# Patient Record
Sex: FEMALE | Employment: OTHER | URBAN - METROPOLITAN AREA
[De-identification: names, ages, dates, MRNs, and addresses within clinical notes are randomized per-mention and may not be internally consistent; named-entity substitution may affect disease eponyms.]

---

## 2018-08-17 ENCOUNTER — HOSPITAL ENCOUNTER (OUTPATIENT)
Dept: LAB | Age: 78
Discharge: HOME | End: 2018-08-17
Payer: MEDICARE

## 2018-08-17 LAB
ANION GAP SERPL CALC-SCNC: 1 MMOL/L (ref 3–18)
BUN SERPL-MCNC: 26 MG/DL (ref 7–18)
BUN/CREAT SERPL: 4 (ref 12–20)
CALCIUM SERPL-MCNC: 9 MG/DL (ref 8.5–10.1)
CHLORIDE SERPL-SCNC: 99 MMOL/L (ref 100–108)
CO2 SERPL-SCNC: 40 MMOL/L (ref 21–32)
CREAT SERPL-MCNC: 6.03 MG/DL (ref 0.6–1.3)
GLUCOSE SERPL-MCNC: 86 MG/DL (ref 74–99)
POTASSIUM SERPL-SCNC: 3.8 MMOL/L (ref 3.5–5.5)
SODIUM SERPL-SCNC: 140 MMOL/L (ref 136–145)

## 2018-08-17 PROCEDURE — 80048 BASIC METABOLIC PNL TOTAL CA: CPT

## 2018-08-17 PROCEDURE — 36415 COLL VENOUS BLD VENIPUNCTURE: CPT

## 2019-01-22 ENCOUNTER — APPOINTMENT (OUTPATIENT)
Dept: CT IMAGING | Age: 79
DRG: 689 | End: 2019-01-22
Attending: EMERGENCY MEDICINE
Payer: MEDICARE

## 2019-01-22 ENCOUNTER — HOSPITAL ENCOUNTER (INPATIENT)
Age: 79
LOS: 2 days | Discharge: NURSING, REHAB OR LONG-TERM CARE | DRG: 689 | End: 2019-01-24
Attending: EMERGENCY MEDICINE | Admitting: INTERNAL MEDICINE
Payer: MEDICARE

## 2019-01-22 ENCOUNTER — APPOINTMENT (OUTPATIENT)
Dept: GENERAL RADIOLOGY | Age: 79
DRG: 689 | End: 2019-01-22
Attending: EMERGENCY MEDICINE
Payer: MEDICARE

## 2019-01-22 DIAGNOSIS — R47.1 DYSARTHRIA: Primary | ICD-10-CM

## 2019-01-22 DIAGNOSIS — Z99.2 ESRD ON HEMODIALYSIS (HCC): ICD-10-CM

## 2019-01-22 DIAGNOSIS — N18.6 ESRD ON HEMODIALYSIS (HCC): ICD-10-CM

## 2019-01-22 PROBLEM — I73.9 PAD (PERIPHERAL ARTERY DISEASE) (HCC): Status: ACTIVE | Noted: 2019-01-22

## 2019-01-22 PROBLEM — M19.90 OA (OSTEOARTHRITIS): Status: ACTIVE | Noted: 2019-01-22

## 2019-01-22 PROBLEM — I63.9 ISCHEMIC STROKE (HCC): Status: ACTIVE | Noted: 2019-01-22

## 2019-01-22 PROBLEM — I50.32 DIASTOLIC CHF, CHRONIC (HCC): Status: ACTIVE | Noted: 2019-01-22

## 2019-01-22 PROBLEM — N39.0 UTI (URINARY TRACT INFECTION): Status: ACTIVE | Noted: 2019-01-22

## 2019-01-22 LAB
ABO + RH BLD: NORMAL
ALBUMIN SERPL-MCNC: 2.7 G/DL (ref 3.4–5)
ALBUMIN/GLOB SERPL: 0.7 {RATIO} (ref 0.8–1.7)
ALP SERPL-CCNC: 102 U/L (ref 45–117)
ALT SERPL-CCNC: 12 U/L (ref 13–56)
AMPHET UR QL SCN: NEGATIVE
ANION GAP BLD CALC-SCNC: 12 MMOL/L (ref 10–20)
ANION GAP SERPL CALC-SCNC: 7 MMOL/L (ref 3–18)
APPEARANCE UR: ABNORMAL
ASPIRIN TEST, ASPIRN: 653 ARU (ref 620–672)
AST SERPL-CCNC: 25 U/L (ref 15–37)
BACTERIA URNS QL MICRO: ABNORMAL /HPF
BARBITURATES UR QL SCN: NEGATIVE
BASOPHILS # BLD: 0 K/UL (ref 0–0.1)
BASOPHILS NFR BLD: 0 % (ref 0–2)
BENZODIAZ UR QL: NEGATIVE
BILIRUB SERPL-MCNC: 0.5 MG/DL (ref 0.2–1)
BILIRUB UR QL: NEGATIVE
BLOOD GROUP ANTIBODIES SERPL: NORMAL
BUN BLD-MCNC: 51 MG/DL (ref 7–18)
BUN SERPL-MCNC: 44 MG/DL (ref 7–18)
BUN/CREAT SERPL: 5 (ref 12–20)
CA-I BLD-MCNC: 1.25 MMOL/L (ref 1.12–1.32)
CALCIUM SERPL-MCNC: 10.2 MG/DL (ref 8.5–10.1)
CANNABINOIDS UR QL SCN: NEGATIVE
CHLORIDE BLD-SCNC: 97 MMOL/L (ref 100–108)
CHLORIDE SERPL-SCNC: 100 MMOL/L (ref 100–108)
CK MB CFR SERPL CALC: ABNORMAL % (ref 0–4)
CK MB SERPL-MCNC: <1 NG/ML (ref 5–25)
CK SERPL-CCNC: 19 U/L (ref 26–192)
CO2 BLD-SCNC: 37 MMOL/L (ref 19–24)
CO2 SERPL-SCNC: 35 MMOL/L (ref 21–32)
COCAINE UR QL SCN: NEGATIVE
COLOR UR: YELLOW
CREAT SERPL-MCNC: 8.9 MG/DL (ref 0.6–1.3)
CREAT UR-MCNC: 8.2 MG/DL (ref 0.6–1.3)
DIFFERENTIAL METHOD BLD: ABNORMAL
EOSINOPHIL # BLD: 0.1 K/UL (ref 0–0.4)
EOSINOPHIL NFR BLD: 1 % (ref 0–5)
EPITH CASTS URNS QL MICRO: ABNORMAL /LPF (ref 0–5)
ERYTHROCYTE [DISTWIDTH] IN BLOOD BY AUTOMATED COUNT: 14.6 % (ref 11.6–14.5)
FIBRINOGEN PPP-MCNC: 267 MG/DL (ref 210–451)
GLOBULIN SER CALC-MCNC: 3.9 G/DL (ref 2–4)
GLUCOSE BLD STRIP.AUTO-MCNC: 85 MG/DL (ref 70–110)
GLUCOSE BLD STRIP.AUTO-MCNC: 86 MG/DL (ref 74–106)
GLUCOSE SERPL-MCNC: 85 MG/DL (ref 74–99)
GLUCOSE UR STRIP.AUTO-MCNC: NEGATIVE MG/DL
HBV SURFACE AG SER QL: <0.1 INDEX
HBV SURFACE AG SER QL: NEGATIVE
HCT VFR BLD AUTO: 34 % (ref 35–45)
HCT VFR BLD CALC: 40 % (ref 36–49)
HDSCOM,HDSCOM: NORMAL
HGB BLD-MCNC: 11.7 G/DL (ref 12–16)
HGB BLD-MCNC: 13.6 G/DL (ref 12–16)
HGB UR QL STRIP: ABNORMAL
INR PPP: 1 (ref 0.8–1.2)
KETONES UR QL STRIP.AUTO: NEGATIVE MG/DL
LEUKOCYTE ESTERASE UR QL STRIP.AUTO: ABNORMAL
LYMPHOCYTES # BLD: 3.1 K/UL (ref 0.9–3.6)
LYMPHOCYTES NFR BLD: 26 % (ref 21–52)
MCH RBC QN AUTO: 32.1 PG (ref 24–34)
MCHC RBC AUTO-ENTMCNC: 34.4 G/DL (ref 31–37)
MCV RBC AUTO: 93.2 FL (ref 74–97)
METHADONE UR QL: NEGATIVE
MONOCYTES # BLD: 1.4 K/UL (ref 0.05–1.2)
MONOCYTES NFR BLD: 12 % (ref 3–10)
NEUTS SEG # BLD: 7.1 K/UL (ref 1.8–8)
NEUTS SEG NFR BLD: 61 % (ref 40–73)
NITRITE UR QL STRIP.AUTO: POSITIVE
OPIATES UR QL: NEGATIVE
PCP UR QL: NEGATIVE
PH UR STRIP: 8.5 [PH] (ref 5–8)
PLATELET # BLD AUTO: 477 K/UL (ref 135–420)
PMV BLD AUTO: 9.8 FL (ref 9.2–11.8)
POTASSIUM BLD-SCNC: 5 MMOL/L (ref 3.5–5.5)
POTASSIUM SERPL-SCNC: 4.9 MMOL/L (ref 3.5–5.5)
PROT SERPL-MCNC: 6.6 G/DL (ref 6.4–8.2)
PROT UR STRIP-MCNC: 300 MG/DL
PROTHROMBIN TIME: 13 SEC (ref 11.5–15.2)
RBC # BLD AUTO: 3.65 M/UL (ref 4.2–5.3)
RBC #/AREA URNS HPF: ABNORMAL /HPF (ref 0–5)
SODIUM BLD-SCNC: 141 MMOL/L (ref 136–145)
SODIUM SERPL-SCNC: 142 MMOL/L (ref 136–145)
SP GR UR REFRACTOMETRY: 1.01 (ref 1–1.03)
SPECIMEN EXP DATE BLD: NORMAL
T3FREE SERPL-MCNC: 1.6 PG/ML (ref 2.18–3.98)
T4 FREE SERPL-MCNC: 1 NG/DL (ref 0.7–1.5)
THROMBIN TIME: 21.2 SECS (ref 13.8–18.2)
TROPONIN I SERPL-MCNC: 0.03 NG/ML (ref 0–0.04)
TSH SERPL DL<=0.05 MIU/L-ACNC: 1.2 UIU/ML (ref 0.36–3.74)
UROBILINOGEN UR QL STRIP.AUTO: 0.2 EU/DL (ref 0.2–1)
WBC # BLD AUTO: 11.8 K/UL (ref 4.6–13.2)
WBC URNS QL MICRO: ABNORMAL /HPF (ref 0–4)

## 2019-01-22 PROCEDURE — 99285 EMERGENCY DEPT VISIT HI MDM: CPT

## 2019-01-22 PROCEDURE — 82962 GLUCOSE BLOOD TEST: CPT

## 2019-01-22 PROCEDURE — 85670 THROMBIN TIME PLASMA: CPT

## 2019-01-22 PROCEDURE — 85610 PROTHROMBIN TIME: CPT

## 2019-01-22 PROCEDURE — 84481 FREE ASSAY (FT-3): CPT

## 2019-01-22 PROCEDURE — 86706 HEP B SURFACE ANTIBODY: CPT

## 2019-01-22 PROCEDURE — 87040 BLOOD CULTURE FOR BACTERIA: CPT

## 2019-01-22 PROCEDURE — 80307 DRUG TEST PRSMV CHEM ANLYZR: CPT

## 2019-01-22 PROCEDURE — 90935 HEMODIALYSIS ONE EVALUATION: CPT

## 2019-01-22 PROCEDURE — 93005 ELECTROCARDIOGRAM TRACING: CPT

## 2019-01-22 PROCEDURE — 94762 N-INVAS EAR/PLS OXIMTRY CONT: CPT

## 2019-01-22 PROCEDURE — 80047 BASIC METABLC PNL IONIZED CA: CPT

## 2019-01-22 PROCEDURE — 80061 LIPID PANEL: CPT

## 2019-01-22 PROCEDURE — 36415 COLL VENOUS BLD VENIPUNCTURE: CPT

## 2019-01-22 PROCEDURE — 85025 COMPLETE CBC W/AUTO DIFF WBC: CPT

## 2019-01-22 PROCEDURE — 77030021352 HC CBL LD SYS DISP COVD -B

## 2019-01-22 PROCEDURE — 87086 URINE CULTURE/COLONY COUNT: CPT

## 2019-01-22 PROCEDURE — 84439 ASSAY OF FREE THYROXINE: CPT

## 2019-01-22 PROCEDURE — 65660000000 HC RM CCU STEPDOWN

## 2019-01-22 PROCEDURE — 5A1D70Z PERFORMANCE OF URINARY FILTRATION, INTERMITTENT, LESS THAN 6 HOURS PER DAY: ICD-10-PCS | Performed by: INTERNAL MEDICINE

## 2019-01-22 PROCEDURE — 80053 COMPREHEN METABOLIC PANEL: CPT

## 2019-01-22 PROCEDURE — 71045 X-RAY EXAM CHEST 1 VIEW: CPT

## 2019-01-22 PROCEDURE — 82550 ASSAY OF CK (CPK): CPT

## 2019-01-22 PROCEDURE — 74011250636 HC RX REV CODE- 250/636: Performed by: INTERNAL MEDICINE

## 2019-01-22 PROCEDURE — 84443 ASSAY THYROID STIM HORMONE: CPT

## 2019-01-22 PROCEDURE — 81001 URINALYSIS AUTO W/SCOPE: CPT

## 2019-01-22 PROCEDURE — 87340 HEPATITIS B SURFACE AG IA: CPT

## 2019-01-22 PROCEDURE — 85384 FIBRINOGEN ACTIVITY: CPT

## 2019-01-22 PROCEDURE — 83036 HEMOGLOBIN GLYCOSYLATED A1C: CPT

## 2019-01-22 PROCEDURE — 85576 BLOOD PLATELET AGGREGATION: CPT

## 2019-01-22 PROCEDURE — 86900 BLOOD TYPING SEROLOGIC ABO: CPT

## 2019-01-22 PROCEDURE — 74011000258 HC RX REV CODE- 258: Performed by: INTERNAL MEDICINE

## 2019-01-22 PROCEDURE — 70450 CT HEAD/BRAIN W/O DYE: CPT

## 2019-01-22 PROCEDURE — 74011250637 HC RX REV CODE- 250/637: Performed by: EMERGENCY MEDICINE

## 2019-01-22 RX ORDER — ACETAMINOPHEN 325 MG/1
650 TABLET ORAL
Status: DISCONTINUED | OUTPATIENT
Start: 2019-01-22 | End: 2019-01-24 | Stop reason: HOSPADM

## 2019-01-22 RX ORDER — PANTOPRAZOLE SODIUM 40 MG/1
40 TABLET, DELAYED RELEASE ORAL
Status: DISCONTINUED | OUTPATIENT
Start: 2019-01-23 | End: 2019-01-24 | Stop reason: HOSPADM

## 2019-01-22 RX ORDER — ONDANSETRON 2 MG/ML
2 INJECTION INTRAMUSCULAR; INTRAVENOUS
Status: DISCONTINUED | OUTPATIENT
Start: 2019-01-22 | End: 2019-01-24 | Stop reason: HOSPADM

## 2019-01-22 RX ORDER — SODIUM BICARBONATE 650 MG/1
325 TABLET ORAL 4 TIMES DAILY
Status: DISCONTINUED | OUTPATIENT
Start: 2019-01-22 | End: 2019-01-23

## 2019-01-22 RX ORDER — ASPIRIN 300 MG/1
300 SUPPOSITORY RECTAL
Status: COMPLETED | OUTPATIENT
Start: 2019-01-22 | End: 2019-01-22

## 2019-01-22 RX ORDER — NIFEDIPINE 30 MG/1
60 TABLET, EXTENDED RELEASE ORAL DAILY
Status: DISCONTINUED | OUTPATIENT
Start: 2019-01-23 | End: 2019-01-24 | Stop reason: HOSPADM

## 2019-01-22 RX ORDER — AMOXICILLIN 250 MG
2 CAPSULE ORAL
Status: DISCONTINUED | OUTPATIENT
Start: 2019-01-22 | End: 2019-01-24 | Stop reason: HOSPADM

## 2019-01-22 RX ORDER — LOSARTAN POTASSIUM 50 MG/1
25 TABLET ORAL DAILY
Status: DISCONTINUED | OUTPATIENT
Start: 2019-01-23 | End: 2019-01-24 | Stop reason: HOSPADM

## 2019-01-22 RX ORDER — PREDNISOLONE ACETATE 10 MG/ML
1 SUSPENSION/ DROPS OPHTHALMIC 4 TIMES DAILY
Status: DISCONTINUED | OUTPATIENT
Start: 2019-01-22 | End: 2019-01-24 | Stop reason: HOSPADM

## 2019-01-22 RX ORDER — ASPIRIN 325 MG
325 TABLET ORAL DAILY
Status: DISCONTINUED | OUTPATIENT
Start: 2019-01-23 | End: 2019-01-24 | Stop reason: HOSPADM

## 2019-01-22 RX ORDER — METOPROLOL TARTRATE 50 MG/1
50 TABLET ORAL 2 TIMES DAILY
Status: DISCONTINUED | OUTPATIENT
Start: 2019-01-22 | End: 2019-01-24 | Stop reason: HOSPADM

## 2019-01-22 RX ORDER — AMOXICILLIN 250 MG
1 CAPSULE ORAL DAILY
Status: DISCONTINUED | OUTPATIENT
Start: 2019-01-23 | End: 2019-01-22 | Stop reason: SDUPTHER

## 2019-01-22 RX ORDER — SODIUM CHLORIDE 9 MG/ML
100 INJECTION, SOLUTION INTRAVENOUS
Status: DISCONTINUED | OUTPATIENT
Start: 2019-01-22 | End: 2019-01-24 | Stop reason: HOSPADM

## 2019-01-22 RX ORDER — ALBUTEROL SULFATE 0.83 MG/ML
2.5 SOLUTION RESPIRATORY (INHALATION)
Status: DISCONTINUED | OUTPATIENT
Start: 2019-01-22 | End: 2019-01-24 | Stop reason: HOSPADM

## 2019-01-22 RX ORDER — HYDRALAZINE HYDROCHLORIDE 50 MG/1
50 TABLET, FILM COATED ORAL 2 TIMES DAILY
Status: DISCONTINUED | OUTPATIENT
Start: 2019-01-22 | End: 2019-01-24 | Stop reason: HOSPADM

## 2019-01-22 RX ORDER — ATORVASTATIN CALCIUM 40 MG/1
80 TABLET, FILM COATED ORAL
Status: DISCONTINUED | OUTPATIENT
Start: 2019-01-22 | End: 2019-01-23

## 2019-01-22 RX ORDER — OXYCODONE AND ACETAMINOPHEN 5; 325 MG/1; MG/1
1 TABLET ORAL
Status: DISCONTINUED | OUTPATIENT
Start: 2019-01-22 | End: 2019-01-24 | Stop reason: HOSPADM

## 2019-01-22 RX ADMIN — ASPIRIN 300 MG: 300 SUPPOSITORY RECTAL at 11:46

## 2019-01-22 RX ADMIN — CEFTRIAXONE 2 G: 2 INJECTION, POWDER, FOR SOLUTION INTRAMUSCULAR; INTRAVENOUS at 18:32

## 2019-01-22 NOTE — ED NOTES
Patient's belongings were sent on back of bed to dialysis: coat, [employer] bag with dialysis log book, store bag with contents, patient wore clothing and shoes from home.

## 2019-01-22 NOTE — PROGRESS NOTES
Nephrology  Daily Progress Note Impression: 
ESRD- HD TTS 
            -Iwona Corrales             -6'86\"             -DTG 52.5 
            -Access: Right leg graft Admitted w/ CVA Hx of diverticulosis h/o MDS Anemia of ESRD Hearing impairment Mineral bone disorder PAD Sickle cell trait Chronic HepC HTN 
HLD Seizure disorder 
  
Plan: 
-HD today - in the setting of CVA, would be gentle with flows and will run asaf even today.  
-Epoetin; Recommend prn transfusion to maintain hgb >7 
-renal diet and binders with meals once she is cleared to eat 
-discussed with pt Patient Active Problem List  
Diagnosis Code  Nocturia R35.1  Incomplete bladder emptying R33.9  Recurrent UTI N39.0  Atrophic vaginitis N95.2  Microhematuria R31.29  
 Chronic kidney disease N18.9 Subjective: 
Transferred from her HD unit with left sided facial droop and right sided weakness. We have been asked to assist with ESRD management. No complaints when I saw her in the ER. Poor historian Physical Exam: 
Patient Vitals for the past 24 hrs: 
 Temp Pulse Resp BP SpO2  
01/22/19 1050  82 13 143/68 99 % 01/22/19 1040  82 15 150/64 99 % 01/22/19 1036 98.4 °F (36.9 °C) 82 17  99 % 01/22/19 1030  82 23 135/63 99 % 01/22/19 1029    145/61  Wt Readings from Last 4 Encounters:  
01/22/19 54.4 kg (120 lb)  
09/26/16 53.5 kg (118 lb)  
02/25/14 67.6 kg (149 lb)  
01/20/14 67.6 kg (149 lb) No intake or output data in the 24 hours ending 01/22/19 1111 OP clear CV: RRR Lungs: Clear Abd: Soft, Nontender Extrem: No edema Labs CBC w/Diff Recent Labs  
  01/22/19 
1040 WBC 11.8  
RBC 3.65* HGB 11.7* HCT 34.0*  
* GRANS 61 LYMPH 26 EOS 1 Chemistry Recent Labs  
  01/22/19 
1040 GLU 85   
K 4.9  CO2 35* BUN 44* CREA 8.90* CA 10.2* AGAP 7  
BUCR 5* AP PENDING  
TP PENDING  
ALB 2.7*  
GLOB PENDING  
AGRAT PENDING  
 No results found for: IRON, FE, TIBC, IBCT, PSAT, FERR Lab Results Component Value Date/Time Calcium 10.2 (H) 01/22/2019 10:40 AM  
  
 
 
Meds reviewed in [facility] No current facility-administered medications for this encounter. Current Outpatient Medications Medication Sig  
 oxyCODONE-acetaminophen (PERCOCET) 5-325 mg per tablet Take 1 Tab by mouth every four (4) hours as needed for Pain. Max Daily Amount: 6 Tabs.  ampicillin (PRINCIPEN) 500 mg capsule Take one tab by mouth twice daily for 7 days and then take one tab by mouth twice weekly for UTI prevention.  hydrALAZINE (APRESOLINE) 50 mg tablet Take 50 mg by mouth two (2) times a day.  estradiol (ESTRACE) 0.01 % (0.1 mg/gram) vaginal cream Place 0.5 grams inside the vagina three nights weekly for thin vaginal tissues.  omeprazole (PRILOSEC) 20 mg capsule Take 20 mg by mouth daily.  ergocalciferol (VITAMIN D2) 50,000 unit capsule Take 50,000 Units by mouth.  guaiFENesin SR (MUCINEX) 600 mg SR tablet Take 600 mg by mouth two (2) times a day.  losartan (COZAAR) 25 mg tablet Take  by mouth daily.  metoprolol (LOPRESSOR) 50 mg tablet Take  by mouth two (2) times a day.  NIFEdipine ER (PROCARDIA XL) 60 mg ER tablet Take 60 mg by mouth daily.  prednisoLONE acetate (PRED FORTE) 1 % ophthalmic suspension Administer 1 Drop to both eyes four (4) times daily.  senna-docusate (SENNA WITH DOCUSATE SODIUM) 8.6-50 mg per tablet Take 1 Tab by mouth daily.  sodium bicarbonate 650 mg tablet Take  by mouth four (4) times daily.  albuterol (PROVENTIL) 2 mg tablet Take 2 mg by mouth three (3) times daily.  aspirin 81 mg tablet Take 81 mg by mouth.  acetaminophen (TYLENOL) 325 mg tablet Take  by mouth every four (4) hours as needed. Thank Lidia Rodrigues MD 
[facility]

## 2019-01-22 NOTE — ED TRIAGE NOTES
Pt arrives to ED by EMS from dialysis clinic. EMS reports that pt did not receive dialysis today, dialysis staff noticed that pt was not baseline, +lt sided facial droop, difficulty speaking, rt sided weakness. EMS reports bs 90. unk time of onset, noticed by dialysis staff. Hx cva 2 weeks ago. Pt assessed by MD upon arrival to ED then to CT on EMS stretcher at time of arrival to ED accompanied by ED technician and ED RN.

## 2019-01-22 NOTE — ED NOTES
Released orders are written per in patient physician.  Patient is off the floor in dialysis receiving treatment; therefore, in patient nurse shall change due times in order to provide those medications due upon arrival.

## 2019-01-22 NOTE — H&P
Hospitalist Admission Note NAME:  Barbie Varma :   1940 MRN:   [id number] Date/Time:  2019 1:35 PM 
 
Subjective: CHIEF COMPLAINT:   
Chief Complaint Patient presents with  Facial Droop HISTORY OF PRESENT ILLNESS:    
Chaim Galarza is a 70 y.o.  female with h/o CVA,diastolic chf,PAD,ESRD on HD,MDS,OA,asthma,diabetes,hypertension,presented to ED because of left facial droop and difficulty talking. Patient is awake,talking but her speech is difficult to [facility] the history mostly obtained from ED attending who first came to contact with patient. Per report,patient went to dialysis today, was noted to have left facial droop and difficulty talking. According to report,patient's speech is normally clear. In ED,CT head did not show any acute process,only noted was a possible age-indeterminate superior left basal ganglia infarct, but nonspecific. It was recommended to admit patient for possible ischemic stroke. Patient was also noted to have UTI. Past Medical History:  
Diagnosis Date  Acquired hypothyroidism   
 patient denies  Asthma  Bilateral cataracts  Chronic kidney disease  Diabetes mellitus  Essential hypertension  H/O drug abuse Heroin  Kidney stone Social History Tobacco Use  Smoking status: Former Smoker Last attempt to quit: 1986 Years since quittin.0  Smokeless tobacco: Former User Substance Use Topics  Alcohol use: No  
  
 
History reviewed. No pertinent family history. Allergies Allergen Reactions  Sulfa (Sulfonamide Antibiotics) Itching Prior to Admission medications Medication Sig Start Date End Date Taking? Authorizing Provider  
oxyCODONE-acetaminophen (PERCOCET) 5-325 mg per tablet Take 1 Tab by mouth every four (4) hours as needed for Pain. Max Daily Amount: 6 Tabs.  16   Brendan Murphy MD  
hydrALAZINE (APRESOLINE) 50 mg tablet Take 50 mg by mouth two (2) times a day.    Provider, Historical  
estradiol (ESTRACE) 0.01 % (0.1 mg/gram) vaginal cream Place 0.5 grams inside the vagina three nights weekly for thin vaginal tissues. 2/25/14   DARLINE Aldridge  
omeprazole (PRILOSEC) 20 mg capsule Take 20 mg by mouth daily. Provider, Historical  
ergocalciferol (VITAMIN D2) 50,000 unit capsule Take 50,000 Units by mouth. Provider, Historical  
guaiFENesin SR (MUCINEX) 600 mg SR tablet Take 600 mg by mouth two (2) times a day. Provider, Historical  
losartan (COZAAR) 25 mg tablet Take  by mouth daily. Provider, Historical  
metoprolol (LOPRESSOR) 50 mg tablet Take  by mouth two (2) times a day. Provider, Historical  
NIFEdipine ER (PROCARDIA XL) 60 mg ER tablet Take 60 mg by mouth daily. Provider, Historical  
prednisoLONE acetate (PRED FORTE) 1 % ophthalmic suspension Administer 1 Drop to both eyes four (4) times daily. Provider, Historical  
senna-docusate (SENNA WITH DOCUSATE SODIUM) 8.6-50 mg per tablet Take 1 Tab by mouth daily. Provider, Historical  
sodium bicarbonate 650 mg tablet Take  by mouth four (4) times daily. Provider, Historical  
albuterol (PROVENTIL) 2 mg tablet Take 2 mg by mouth three (3) times daily. Provider, Historical  
aspirin 81 mg tablet Take 81 mg by mouth. Provider, Historical  
acetaminophen (TYLENOL) 325 mg tablet Take  by mouth every four (4) hours as needed. Provider, Historical  
 
 
REVIEW OF SYSTEMS:   
Constitutional:  No fever or weight loss HEENT:  No headache or visual changes Cardiovascular:  No chest pain, no palpitations. Respiratory:  No coughing, wheezing, or shortness of breath. GI:  No abdominal pain. No nausea or vomiting. No diarrhea :  No hematuria or dysuria. No frequency, retention, urinary incontinence. Skin:  No rashes or moles Neuro: Left facial droop. Difficulty with speech. Hematological:  No bruising or bleeding Endocrine:  No diabetes or thyroid disease Objective: VITALS:  
 
 
Visit Vitals /62 Pulse 84 Temp 98.4 °F (36.9 °C) Resp 19 Ht 5' 5\" (1.651 m) Wt 54.4 kg (120 lb) SpO2 98% BMI 19.97 kg/m² PHYSICAL EXAM:  
General:    Lying in bed in no acute distress. Undergoing dialysis HEENT:  Pupils equal.  Sclera anicteric. Conjunctiva pink. Mucous membranes dry. Neck:  Supple. Trachea midline. No accessory muscle use. No thyromegaly. No jugular venous distention CV:                  Regular rate and rhythm. Lungs:   Clear to auscultation bilaterally. No Wheezing or Rhonchi. No rales. Normal to percussion Abdomen:   Soft, non-tender. Not distended. Bowel sounds normal. No organomegaly Extremities: No cyanosis. No edema. No clubbing Neurologic: Alert and oriented X 3. No facial droop. Contracture right hand - chronic. Skin:                Warm and dry. No rashes. LAB DATA REVIEWED:   
Recent Results (from the past 24 hour(s)) POC CHEM8 Collection Time: 01/22/19 10:39 AM  
Result Value Ref Range CO2, POC 37 (H) 19 - 24 MMOL/L Glucose, POC 86 74 - 106 MG/DL  
 BUN, POC 51 (H) 7 - 18 MG/DL Creatinine, POC 8.2 (H) 0.6 - 1.3 MG/DL  
 GFRAA, POC 6 (L) >60 ml/min/1.73m2 GFRNA, POC 5 (L) >60 ml/min/1.73m2 Sodium,  136 - 145 MMOL/L Potassium, POC 5.0 3.5 - 5.5 MMOL/L Calcium, ionized (POC) 1.25 1.12 - 1.32 mmol/L Chloride, POC 97 (L) 100 - 108 MMOL/L Anion gap, POC 12 10 - 20 Hematocrit, POC 40 36 - 49 % Hemoglobin, POC 13.6 12 - 16 G/DL METABOLIC PANEL, COMPREHENSIVE Collection Time: 01/22/19 10:40 AM  
Result Value Ref Range Sodium 142 136 - 145 mmol/L Potassium 4.9 3.5 - 5.5 mmol/L Chloride 100 100 - 108 mmol/L  
 CO2 35 (H) 21 - 32 mmol/L Anion gap 7 3.0 - 18 mmol/L Glucose 85 74 - 99 mg/dL BUN 44 (H) 7.0 - 18 MG/DL  Creatinine 8.90 (H) 0.6 - 1.3 MG/DL  
 BUN/Creatinine ratio 5 (L) 12 - 20    
 GFR est AA 5 (L) >60 ml/min/1.73m2 GFR est non-AA 4 (L) >60 ml/min/1.73m2 Calcium 10.2 (H) 8.5 - 10.1 MG/DL Bilirubin, total 0.5 0.2 - 1.0 MG/DL  
 ALT (SGPT) 12 (L) 13 - 56 U/L  
 AST (SGOT) 25 15 - 37 U/L Alk. phosphatase 102 45 - 117 U/L Protein, total 6.6 6.4 - 8.2 g/dL Albumin 2.7 (L) 3.4 - 5.0 g/dL Globulin 3.9 2.0 - 4.0 g/dL A-G Ratio 0.7 (L) 0.8 - 1.7 PROTHROMBIN TIME + INR Collection Time: 01/22/19 10:40 AM  
Result Value Ref Range Prothrombin time 13.0 11.5 - 15.2 sec INR 1.0 0.8 - 1.2 THROMBIN TIME Collection Time: 01/22/19 10:40 AM  
Result Value Ref Range Thrombin time 21.2 (H) 13.8 - 18.2 SECS FIBRINOGEN Collection Time: 01/22/19 10:40 AM  
Result Value Ref Range Fibrinogen 267 210 - 451 mg/dL TYPE & SCREEN Collection Time: 01/22/19 10:40 AM  
Result Value Ref Range Crossmatch Expiration 01/25/2019 ABO/Rh(D) O POSITIVE Antibody screen NEG   
ASPIRIN TEST Collection Time: 01/22/19 10:40 AM  
Result Value Ref Range Aspirin test 653 620 - 672 ARU  
CBC WITH AUTOMATED DIFF Collection Time: 01/22/19 10:40 AM  
Result Value Ref Range WBC 11.8 4.6 - 13.2 K/uL  
 RBC 3.65 (L) 4.20 - 5.30 M/uL  
 HGB 11.7 (L) 12.0 - 16.0 g/dL HCT 34.0 (L) 35.0 - 45.0 % MCV 93.2 74.0 - 97.0 FL  
 MCH 32.1 24.0 - 34.0 PG  
 MCHC 34.4 31.0 - 37.0 g/dL  
 RDW 14.6 (H) 11.6 - 14.5 % PLATELET 145 (H) 622 - 420 K/uL MPV 9.8 9.2 - 11.8 FL  
 NEUTROPHILS 61 40 - 73 % LYMPHOCYTES 26 21 - 52 % MONOCYTES 12 (H) 3 - 10 % EOSINOPHILS 1 0 - 5 % BASOPHILS 0 0 - 2 %  
 ABS. NEUTROPHILS 7.1 1.8 - 8.0 K/UL  
 ABS. LYMPHOCYTES 3.1 0.9 - 3.6 K/UL  
 ABS. MONOCYTES 1.4 (H) 0.05 - 1.2 K/UL  
 ABS. EOSINOPHILS 0.1 0.0 - 0.4 K/UL  
 ABS. BASOPHILS 0.0 0.0 - 0.1 K/UL  
 DF AUTOMATED CARDIAC PANEL,(CK, CKMB & TROPONIN) Collection Time: 01/22/19 10:40 AM  
Result Value Ref Range CK 19 (L) 26 - 192 U/L  
 CK - MB <1.0 <3.6 ng/ml CK-MB Index  0.0 - 4.0 % CALCULATION NOT PERFORMED WHEN RESULT IS BELOW LINEAR LIMIT Troponin-I, QT 0.03 0.0 - 0.045 NG/ML  
GLUCOSE, POC Collection Time: 01/22/19 10:42 AM  
Result Value Ref Range Glucose (POC) 85 70 - 110 mg/dL EKG, 12 LEAD, INITIAL Collection Time: 01/22/19 10:46 AM  
Result Value Ref Range Ventricular Rate 82 BPM  
 Atrial Rate 82 BPM  
 P-R Interval 168 ms QRS Duration 86 ms  
 Q-T Interval 378 ms QTC Calculation (Bezet) 441 ms Calculated P Axis 23 degrees Calculated R Axis -12 degrees Calculated T Axis 19 degrees Diagnosis Normal sinus rhythm Possible Left atrial enlargement Left ventricular hypertrophy Abnormal ECG When compared with ECG of 26-SEP-2016 14:15, 
Nonspecific T wave abnormality now evident in Anterior leads QT has shortened URINALYSIS W/ RFLX MICROSCOPIC Collection Time: 01/22/19 11:47 AM  
Result Value Ref Range Color YELLOW Appearance TURBID Specific gravity 1.012 1.005 - 1.030    
 pH (UA) 8.5 (H) 5.0 - 8.0 Protein 300 (A) NEG mg/dL Glucose NEGATIVE  NEG mg/dL Ketone NEGATIVE  NEG mg/dL Bilirubin NEGATIVE  NEG Blood MODERATE (A) NEG Urobilinogen 0.2 0.2 - 1.0 EU/dL Nitrites POSITIVE (A) NEG Leukocyte Esterase LARGE (A) NEG    
DRUG SCREEN, URINE Collection Time: 01/22/19 11:47 AM  
Result Value Ref Range BENZODIAZEPINES NEGATIVE  NEG    
 BARBITURATES NEGATIVE  NEG    
 THC (TH-CANNABINOL) NEGATIVE  NEG    
 OPIATES NEGATIVE  NEG    
 PCP(PHENCYCLIDINE) NEGATIVE  NEG    
 COCAINE NEGATIVE  NEG    
 AMPHETAMINES NEGATIVE  NEG METHADONE NEGATIVE  NEG HDSCOM (NOTE) URINE MICROSCOPIC ONLY Collection Time: 01/22/19 11:47 AM  
Result Value Ref Range WBC TOO NUMEROUS TO COUNT 0 - 4 /hpf  
 RBC 4 to 10 0 - 5 /hpf Epithelial cells FEW 0 - 5 /lpf Bacteria 4+ (A) NEG /hpf Assessment/Plan:  
Ischemic stroke UTI 
ESRD Diastolic HF 
PAD Diabetes ___________________________________________________ PLAN:   
1. Ischemic stroke 
 -CT head c/w possible age-indeterminate superior left basal ganglia infarct, but 
nonspecific and difficult to separate from adjacent deep hemispheric white 
matter hypodensity. No definite CT evidence of acute cortical infarct is seen 
-Aspirin 
-Neuro-checks 
-Permissive hypertension 
-MRI 
-Neurology consult 
-PT/OT/Speech 2. UTI 
-Ceftriaxone 
-Blood cx. Ucx 
 
3. ESRD 
 -Undergoing dialysis now. Nephrology consult 4. Diastolic HF 
5. PAD 6. Diabetes 
-Resume other meds for her chronic medical problems. DVT prophylaxis:scds Full code Disposition:tbd 
 
___________________________________________________ Admitting Physician: Giancarlo Kingston MD

## 2019-01-22 NOTE — ED PROVIDER NOTES
Caesar Batres is a 70 y.o. Female with reported recent small cva about 2 weeks ago per ems, went to dialysis this morning and was noted to have left facial droop, diff time talking and right sided weakness. Pt nl talks well but noted to have increased diff time this morning. Pt denies pain. Last dialyzed this past Saturday. Unknown time of onset The history is provided by the EMS personnel, medical records and the patient. The history is limited by the condition of the patient. Past Medical History:  
Diagnosis Date  Acquired hypothyroidism   
 patient denies  Asthma  Bilateral cataracts  Chronic kidney disease  Diabetes mellitus  Essential hypertension  H/O drug abuse Heroin  Kidney stone Past Surgical History:  
Procedure Laterality Date  HX COLONOSCOPY    
 HX ENDOSCOPY  6/10/2010  HX HYSTERECTOMY  HX LITHOTRIPSY  HX OTHER SURGICAL Cochlear implant  HX OTHER SURGICAL  2011 Bone marrow biopsy No family history on file. Social History Socioeconomic History  Marital status: SINGLE Spouse name: Not on file  Number of children: Not on file  Years of education: Not on file  Highest education level: Not on file Social Needs  Financial resource strain: Not on file  Food insecurity - worry: Not on file  Food insecurity - inability: Not on file  Transportation needs - medical: Not on file  Transportation needs - non-medical: Not on file Occupational History  Not on file Tobacco Use  Smoking status: Former Smoker Last attempt to quit: 1986 Years since quittin.0 Substance and Sexual Activity  Alcohol use: No  
 Drug use: No  
 Sexual activity: Not on file Other Topics Concern  Not on file Social History Narrative  Not on file ALLERGIES: Sulfa (sulfonamide antibiotics) Review of Systems Unable to perform ROS: Mental status change Vitals:  
 01/22/19 1036 01/22/19 1040 01/22/19 1050 01/22/19 1100 BP:  150/64 143/68 152/66 Pulse: 82 82 82 81 Resp: 17 15 13 15 Temp: 98.4 °F (36.9 °C) SpO2: 99% 99% 99% 99% Weight:      
Height:      
      
 
Physical Exam  
Constitutional: She is oriented to person, place, and time. She appears well-developed and well-nourished. Non-toxic appearance. She does not have a sickly appearance. She appears ill. She appears distressed. HENT:  
Head: Normocephalic and atraumatic. Right Ear: External ear normal.  
Left Ear: External ear normal.  
Nose: Nose normal.  
Mouth/Throat: Uvula is midline, oropharynx is clear and moist and mucous membranes are normal.  
Eyes: Conjunctivae are normal. No scleral icterus. Neck: Neck supple. Cardiovascular: Normal rate, regular rhythm, normal heart sounds and intact distal pulses. Pulmonary/Chest: Effort normal and breath sounds normal.  
Abdominal: Soft. There is no tenderness. Musculoskeletal: She exhibits no edema. Neurological: She is alert and oriented to person, place, and time. Gait normal.  
Left facial droop. Slurred speech. Slightly more weak on right side Skin: Skin is warm and dry. She is not diaphoretic. Psychiatric: Her behavior is normal.  
Nursing note and vitals reviewed. MDM Procedures Vitals: 
Patient Vitals for the past 12 hrs: 
 Temp Pulse Resp BP SpO2  
01/22/19 1100  81 15 152/66 99 % 01/22/19 1050  82 13 143/68 99 % 01/22/19 1040  82 15 150/64 99 % 01/22/19 1036 98.4 °F (36.9 °C) 82 17  99 % 01/22/19 1030  82 23 135/63 99 % 01/22/19 1029    145/61  Medications ordered:  
Medications  
aspirin (ASA) suppository 300 mg (not administered) Lab findings: 
Recent Results (from the past 12 hour(s)) TYPE & SCREEN Collection Time: 01/22/19 10:30 AM  
Result Value Ref Range Crossmatch Expiration 01/25/2019 ABO/Rh(D) PENDING  Antibody screen PENDING   
POC CHEM8  
 Collection Time: 01/22/19 10:39 AM  
Result Value Ref Range CO2, POC 37 (H) 19 - 24 MMOL/L Glucose, POC 86 74 - 106 MG/DL  
 BUN, POC 51 (H) 7 - 18 MG/DL Creatinine, POC 8.2 (H) 0.6 - 1.3 MG/DL  
 GFRAA, POC 6 (L) >60 ml/min/1.73m2 GFRNA, POC 5 (L) >60 ml/min/1.73m2 Sodium,  136 - 145 MMOL/L Potassium, POC 5.0 3.5 - 5.5 MMOL/L Calcium, ionized (POC) 1.25 1.12 - 1.32 mmol/L Chloride, POC 97 (L) 100 - 108 MMOL/L Anion gap, POC 12 10 - 20 Hematocrit, POC 40 36 - 49 % Hemoglobin, POC 13.6 12 - 16 G/DL METABOLIC PANEL, COMPREHENSIVE Collection Time: 01/22/19 10:40 AM  
Result Value Ref Range Sodium 142 136 - 145 mmol/L Potassium 4.9 3.5 - 5.5 mmol/L Chloride 100 100 - 108 mmol/L  
 CO2 35 (H) 21 - 32 mmol/L Anion gap 7 3.0 - 18 mmol/L Glucose 85 74 - 99 mg/dL BUN 44 (H) 7.0 - 18 MG/DL Creatinine 8.90 (H) 0.6 - 1.3 MG/DL  
 BUN/Creatinine ratio 5 (L) 12 - 20 GFR est AA 5 (L) >60 ml/min/1.73m2 GFR est non-AA 4 (L) >60 ml/min/1.73m2 Calcium 10.2 (H) 8.5 - 10.1 MG/DL Bilirubin, total 0.5 0.2 - 1.0 MG/DL  
 ALT (SGPT) 12 (L) 13 - 56 U/L  
 AST (SGOT) 25 15 - 37 U/L Alk. phosphatase 102 45 - 117 U/L Protein, total 6.6 6.4 - 8.2 g/dL Albumin 2.7 (L) 3.4 - 5.0 g/dL Globulin 3.9 2.0 - 4.0 g/dL A-G Ratio 0.7 (L) 0.8 - 1.7 PROTHROMBIN TIME + INR Collection Time: 01/22/19 10:40 AM  
Result Value Ref Range Prothrombin time 13.0 11.5 - 15.2 sec INR 1.0 0.8 - 1.2 THROMBIN TIME Collection Time: 01/22/19 10:40 AM  
Result Value Ref Range Thrombin time 21.2 (H) 13.8 - 18.2 SECS FIBRINOGEN Collection Time: 01/22/19 10:40 AM  
Result Value Ref Range Fibrinogen 267 210 - 451 mg/dL ASPIRIN TEST Collection Time: 01/22/19 10:40 AM  
Result Value Ref Range Aspirin test 653 620 - 672 ARU  
CBC WITH AUTOMATED DIFF Collection Time: 01/22/19 10:40 AM  
Result Value Ref Range WBC 11.8 4.6 - 13.2 K/uL RBC 3.65 (L) 4.20 - 5.30 M/uL  
 HGB 11.7 (L) 12.0 - 16.0 g/dL HCT 34.0 (L) 35.0 - 45.0 % MCV 93.2 74.0 - 97.0 FL  
 MCH 32.1 24.0 - 34.0 PG  
 MCHC 34.4 31.0 - 37.0 g/dL  
 RDW 14.6 (H) 11.6 - 14.5 % PLATELET 196 (H) 070 - 420 K/uL MPV 9.8 9.2 - 11.8 FL  
 NEUTROPHILS 61 40 - 73 % LYMPHOCYTES 26 21 - 52 % MONOCYTES 12 (H) 3 - 10 % EOSINOPHILS 1 0 - 5 % BASOPHILS 0 0 - 2 %  
 ABS. NEUTROPHILS 7.1 1.8 - 8.0 K/UL  
 ABS. LYMPHOCYTES 3.1 0.9 - 3.6 K/UL  
 ABS. MONOCYTES 1.4 (H) 0.05 - 1.2 K/UL  
 ABS. EOSINOPHILS 0.1 0.0 - 0.4 K/UL  
 ABS. BASOPHILS 0.0 0.0 - 0.1 K/UL  
 DF AUTOMATED CARDIAC PANEL,(CK, CKMB & TROPONIN) Collection Time: 01/22/19 10:40 AM  
Result Value Ref Range CK 19 (L) 26 - 192 U/L  
 CK - MB <1.0 <3.6 ng/ml CK-MB Index  0.0 - 4.0 % CALCULATION NOT PERFORMED WHEN RESULT IS BELOW LINEAR LIMIT Troponin-I, QT 0.03 0.0 - 0.045 NG/ML  
GLUCOSE, POC Collection Time: 01/22/19 10:42 AM  
Result Value Ref Range Glucose (POC) 85 70 - 110 mg/dL EKG, 12 LEAD, INITIAL Collection Time: 01/22/19 10:46 AM  
Result Value Ref Range Ventricular Rate 82 BPM  
 Atrial Rate 82 BPM  
 P-R Interval 168 ms QRS Duration 86 ms  
 Q-T Interval 378 ms QTC Calculation (Bezet) 441 ms Calculated P Axis 23 degrees Calculated R Axis -12 degrees Calculated T Axis 19 degrees Diagnosis Normal sinus rhythm Possible Left atrial enlargement Left ventricular hypertrophy Abnormal ECG When compared with ECG of 26-SEP-2016 14:15, 
Nonspecific T wave abnormality now evident in Anterior leads QT has shortened EKG interpretation by ED Physician: 
nsr with ns tw changes. No acute st changes Rate 81, pr 168, qtc 441 Not sig changed Cardiac monitor: nl rate, reg rhythm, no ectopy Pulse ox: 99% ra X-Ray, CT or other radiology findings or impressions: 
CT HEAD WO CONT Final Result IMPRESSION:  
  
 Study degraded by considerable streak artifact related to cochlear implant  
device. 1. No acute intracranial hemorrhage, mass effect, midline shift, or herniation. 2. Very mild nonspecific white matter disease likely representing chronic small  
vessel changes. 3. Possible age-indeterminate superior left basal ganglia infarct, but  
nonspecific and difficult to separate from adjacent deep hemispheric white  
matter hypodensity. No definite CT evidence of acute cortical infarct is seen. Please note that noncontrast head CT may be normal in early acute infarct. CRITICAL RESULT:    
These critical results on this CODE S patient were directly communicated to Dr. Ayala Soler on 1/22/2019 10:31 AM.  Results understood and acknowledged. XR CHEST PORT    (Results Pending) Progress notes, Consult notes or additional Procedure notes:  
Seen by dr Janine Haro from [facility], rec admission. Pt not tpa candidate nor mri due to cochlear implant D/w Harley on call for [facility] who will arrange dialysis D/w dr Sebastian Sanchez on call for hospitalist who will admit Reevaluation of patient:  
stable Disposition: 
Diagnosis: 1. Dysarthria 2. ESRD on hemodialysis ([facility] [address] Disposition: admit Follow-up Information None Medication List  
  
ASK your doctor about these medications   
albuterol 2 mg tablet Commonly known as:  PROVENTIL 
  
ampicillin 500 mg capsule Commonly known as:  PRINCIPEN Take one tab by mouth twice daily for 7 days and then take one tab by mouth twice weekly for UTI prevention. aspirin 81 mg tablet 
  
estradiol 0.01 % (0.1 mg/gram) vaginal cream 
Commonly known as:  ESTRACE Place 0.5 grams inside the vagina three nights weekly for thin vaginal tissues. guaiFENesin  mg SR tablet Commonly known as:  MUCINEX 
  
hydrALAZINE 50 mg tablet Commonly known as:  APRESOLINE 
  
losartan 25 mg tablet Commonly known as:  COZAAR 
  
metoprolol tartrate 50 mg tablet Commonly known as:  LOPRESSOR 
  
NIFEdipine ER 60 mg ER tablet Commonly known as:  PROCARDIA XL 
  
omeprazole 20 mg capsule Commonly known as:  PRILOSEC 
  
oxyCODONE-acetaminophen 5-325 mg per tablet Commonly known as:  PERCOCET Take 1 Tab by mouth every four (4) hours as needed for Pain. Max Daily Amount: 6 Tabs. prednisoLONE acetate 1 % ophthalmic suspension Commonly known as:  PRED FORTE SENNA WITH DOCUSATE SODIUM 8.6-50 mg per tablet Generic drug:  senna-docusate 
  
sodium bicarbonate 650 mg tablet TYLENOL 325 mg tablet Generic drug:  acetaminophen VITAMIN D2 50,000 unit capsule Generic drug:  ergocalciferol

## 2019-01-22 NOTE — ED NOTES
Dual bedside NIH and report performed with Willy Mike RN of 2. Patient's belongings and wheelchair placed at bedside. Patient remains at baseline mentation. No new deficits noted since last seen by this nurse prior to dialysis treatment. Family member present at bedside. States \"patient cannot hear well and has lost hearing aides somewhere at home\". Extra time allotted for patient to respond to commands. No acute distress noted. Denies pain at this time. Dialysis site also seems to be [facility].

## 2019-01-22 NOTE — PROGRESS NOTES
completed the initial Spiritual Assessment of the patient in bed 6 of the emergency room and offered Pastoral Care support to patient. Patient is very hard of hearing and asked me to write down my message to her. No family seen at this time. Patient does not have any [place]/cultural needs that will affect patients preferences in health care. Chaplains will continue to follow and will provide pastoral care on an as needed/requested basis. Steve Logan Board Certified [employer] Spiritual Care Department [phone]

## 2019-01-22 NOTE — DIALYSIS
ACUTE HEMODIALYSIS FLOW SHEET 
 
HEMODIALYSIS ORDERS: Physician: PENNY Blackburn Dialyzer: jagjit   Duration: 3 hr  BFR: 300   DFR: 600 Dialysate:  Temp 37 K+   2    Ca+  2.5 Na 138 Bicarb 35 Weight:  54.4 kg    Patient Chart [x]     Unable to Obtain []   Dry weight/UF Goal: 0 Access AVG Needle Gauge 15 Heparin []  Bolus      Units    [] Hourly       Units    [x]None Catheter locking solution n/a  
Pre BP:   161/77    Pulse:     79      Respirations: 18 Temperature:   97.4  Tx: NS       ml/Bolus  Other        [x] N/A Labs: Pre        Post:        [x] N/A Additional Orders(medications, blood products, hypotension management):       [x] N/A [x] Carlton Consent Verified CATHETER ACCESS: [x]N/A   []Right   []Left   []IJ     []Fem   []Chest wall  
[] First use X-ray verified     []Tunnel                [] Non Tunneled []No S/S infection  []Redness  []Drainage []Cultured []Swelling []Pain []Medical Aseptic Prep Utilized   []Dressing Changed  [] Biopatch  Date:      
[]Clotted   []Patent   Flows: []Good  []Poor  []Reversed If access problem,  notified: []Yes    []N/A  Date:        
 
GRAFT/FISTULA ACCESS:  []N/A     [x]Right     []Left     []UE     [x]LE [x]AVG   []AVF        []Buttonhole    [x]Medical Aseptic Prep Utilized [x]No S/S infection  []Redness  []Drainage []Cultured []Swelling []Pain Bruit:   [x] Strong    [] Weak       Thrill :   [x] Strong    [] Weak Needle Gauge: 16   Length: 1 If access problem,  notified: []Yes     [x]N/A  Date:       
Please describe access if present and not used:  
 
 
GENERAL ASSESSMENT:   
LUNGS:  Rate 18 SaO2%        [] N/A    [x] Clear  [] Coarse  [] Crackles  [] Wheezing 
      [] Diminished     Location : []RLL   []LLL    []RUL  []SERGIO Cough: []Productive  []Dry  [x]N/A   Respirations:  [x]Easy  []Labored Therapy:  [x]RA  []NC  l/min    Mask: []NRB []Venti       O2% []Ventilator  []Intubated  [] Trach  [] BiPaP CARDIAC: [x]Regular      [] Irregular   [] Pericardial Rub  [] JVD []  Monitored  [] Bedside  [] Remotely monitored [] N/A  Rhythm: EDEMA: [] None  [x]Generalized  [] Pitting [] 1    [] 2    [] 3    [] 4 [] Facial  [] Pedal  []  UE  [x] LE  
SKIN:   [x] Warm  [] Hot     [] Cold   [x] Dry     [] Pale   [] Diaphoretic    
             [] Flushed  [] Jaundiced  [] Cyanotic  [] Rash  [] Weeping LOC:    [] Alert      [x]Oriented:    [x] Person     [] Place  []Time 
             [x] Confused  [] Lethargic  [] Medicated  [] Non-responsive GI / ABDOMEN:  [] Flat    [] Distended    [x] Soft    [] Firm   []  Obese 
                           [] Diarrhea  [x] Bowel Sounds  [] Nausea  [] Vomiting  / URINE ASSESSMENT:[] Voiding   [x] Oliguria  [] Anuria   []  Dhillon [] Incontinent    []  Incontinent Brief      []  Bathroom Privileges PAIN: [x] 0 []1  []2   []3   []4   []5   []6   []7   []8   []9   []10 Scale 0-10  Action/Follow Up: N/A MOBILITY:  [] Amb    [] Amb/Assist    [] Bed    [] Wheelchair  [x] [employer] All Vitals and Treatment Details on Attached Flowsheet Hospital: [facility] Room # 2102 [] 1st Time Acute  [] Stat  [x] Routine  [] Urgent [x] Acute Room  []  Bedside  [] ICU/CCU  [] ER Isolation Precautions:  [x] Dialysis   [] Airborne   [] Contact    [] Reverse Special Considerations:         [] Blood Consent Verified [x]N/A ALLERGIES:   [] NKA    Sulfa Code Status:  [] Full Code  [] DNR  [x] Other     Not on file HBsAg ONLY: Date Drawn 01/22/19         [x]Negative []Positive []Unknown  2nd RN check :  complete HBsAb: Date 01/22/19    [] Susceptible   [x] [id number] []Not Drawn  [] Drawn Current Labs:    Date of Labs: Today [x] Cut and paste current labs here.     Results for Margaretann Lennox (MRN [id number]) as of 1/22/2019 16:45 
 Ref. Range 1/22/2019 10:40 WBC Latest Ref Range: 4.6 - 13.2 K/uL 11.8 RBC Latest Ref Range: 4.20 - 5.30 M/uL 3.65 (L) HGB Latest Ref Range: 12.0 - 16.0 g/dL 11.7 (L) HCT Latest Ref Range: 35.0 - 45.0 % 34.0 (L) MCV Latest Ref Range: 74.0 - 97.0 FL 93.2 MCH Latest Ref Range: 24.0 - 34.0 PG 32.1 MCHC Latest Ref Range: 31.0 - 37.0 g/dL 34.4 RDW Latest Ref Range: 11.6 - 14.5 % 14.6 (H) PLATELET Latest Ref Range: 135 - 420 K/uL 477 (H) MPV Latest Ref Range: 9.2 - 11.8 FL 9.8 NEUTROPHILS Latest Ref Range: 40 - 73 % 61 LYMPHOCYTES Latest Ref Range: 21 - 52 % 26 MONOCYTES Latest Ref Range: 3 - 10 % 12 (H) EOSINOPHILS Latest Ref Range: 0 - 5 % 1  
BASOPHILS Latest Ref Range: 0 - 2 % 0  
DF Latest Units:   AUTOMATED  
ABS. NEUTROPHILS Latest Ref Range: 1.8 - 8.0 K/UL 7.1 ABS. LYMPHOCYTES Latest Ref Range: 0.9 - 3.6 K/UL 3.1  
ABS. MONOCYTES Latest Ref Range: 0.05 - 1.2 K/UL 1.4 (H)  
ABS. EOSINOPHILS Latest Ref Range: 0.0 - 0.4 K/UL 0.1 ABS. BASOPHILS Latest Ref Range: 0.0 - 0.1 K/UL 0.0 INR Latest Ref Range: 0.8 - 1.2   1.0 Prothrombin time Latest Ref Range: 11.5 - 15.2 sec 13.0 Fibrinogen Latest Ref Range: 210 - 451 mg/dL 267 Thrombin time Latest Ref Range: 13.8 - 18.2 SECS 21.2 (H) Sodium Latest Ref Range: 136 - 145 mmol/L 142 Potassium Latest Ref Range: 3.5 - 5.5 mmol/L 4.9 Chloride Latest Ref Range: 100 - 108 mmol/L 100 CO2 Latest Ref Range: 21 - 32 mmol/L 35 (H) Anion gap Latest Ref Range: 3.0 - 18 mmol/L 7 Glucose Latest Ref Range: 74 - 99 mg/dL 85 BUN Latest Ref Range: 7.0 - 18 MG/DL 44 (H) Creatinine Latest Ref Range: 0.6 - 1.3 MG/DL 8.90 (H) BUN/Creatinine ratio Latest Ref Range: 12 - 20   5 (L) Calcium Latest Ref Range: 8.5 - 10.1 MG/DL 10.2 (H) GFR est non-AA Latest Ref Range: >60 ml/min/1.73m2 4 (L)  
GFR est AA Latest Ref Range: >60 ml/min/1.73m2 5 (L) Bilirubin, total Latest Ref Range: 0.2 - 1.0 MG/DL 0.5 Protein, total Latest Ref Range: 6.4 - 8.2 g/dL 6.6 Albumin Latest Ref Range: 3.4 - 5.0 g/dL 2.7 (L) Globulin Latest Ref Range: 2.0 - 4.0 g/dL 3.9 A-G Ratio Latest Ref Range: 0.8 - 1.7   0.7 (L) ALT (SGPT) Latest Ref Range: 13 - 56 U/L 12 (L) AST Latest Ref Range: 15 - 37 U/L 25 Alk. phosphatase Latest Ref Range: 45 - 117 U/L 102 CK Latest Ref Range: 26 - 192 U/L 19 (L) CK-MB Index Latest Ref Range: 0.0 - 4.0 % CALCULATION NOT P... CK - MB Latest Ref Range: <3.6 ng/ml <1.0 Troponin-I, Qt. Latest Ref Range: 0.0 - 0.045 NG/ML 0.03 Crossmatch Expiration Unknown 01/25/2019 TYPE & SCREEN Unknown Rpt ASPIRIN TEST Unknown Rpt DIET:  [] Renal    [] Other     [x] NPO     []  Diabetic PRIMARY NURSE REPORT: First initial/Last name/Title Pre Dialysis: Dasha Amaya RN    Time: 4601 EDUCATION:   
[x] Patient [] Other         Knowledge Basis: []None [x]Minimal [] Substantial  
Barriers to learning  []N/A Pt is having intermittent periods of incoherence  
[] Access Care     [] S&S of infection     [] Fluid Management     []K+     [x]Procedural   
[]Albumin     [] Medications     [] Tx Options     [] Transplant     [] Diet     [] Other Teaching Tools:  [x] Explain  [] Demo  [] Handouts [] Video Patient response:   [] Verbalized understanding  [] Teach back  [] Return demonstration [x] Requires follow up Inappropriate due to         
 
[x] Time Out/Safety Check  [x]Extracorporeal Circuit Tested for integrity RO/HEMODIALYSIS MACHINE SAFETY CHECKS  Before each treatment:    
Machine Number:                                                                     
                                                 [address] 
                                [] Portable Machine #11/RO serial # [id number] [x] Portable Machine #12/RO serial # [id number] [] Portable Machine #13/RO serial #  [id number] Alarm Test:  Pass time 7307         Other:        
[x] RO/Machine Log Complete Temp    37 Dialysate: pH  7.2 Conductivity: Meter   14     HD Machine   14.2                  TCD: 13.9 Dialyzer Lot # [id number]            Blood Tubing Lot # [id number]          Saline Lot #  [id number]  
 
CHLORINE TESTING-Before each treatment and every 4 hours Total Chlorine: [x] less than 0.1 ppm  Time: 0900 4 Hr/2nd Check Time: 1300  
(if greater than 0.1 ppm from Primary then every 30 minutes from Secondary) TREATMENT INITIATION  with Dialysis Precautions:  
[x] All Connections Secured                 [x] Saline Line Double Clamped  
[x] Venous Parameters Set                  [x] Arterial Parameters Set [x] Prime Given 250mL                         [x]Air Foam Detector Engaged Treatment Initiation Note:Received pt to the treatment area awake and alert. Access site is clean, dry and intact with no s/s of infection. Treatment started as ordered. During Treatment Notes: 
 
 
  
 
Medication Dose Volume Route Initials Dialyzer Cleared: [] Good [x] Fair  [] Poor Blood processed:  51.2 L 
UF Removed  0 Ml POst BP:   117/68       Pulse: 72      Respirations: 18  Temperature: 97.7 Post Tx Vascular Access: AVF/AVG: Bleeding stopped Art 5 min. Dev. 5 Min Catheter: Locking solution: Heparin 1:1000 Art. Dev. N/A Post Assessment:  
  
                              Skin:  [x] Warm  [x] Dry [] Diaphoretic    [] Flushed  [] Pale [] Cyanotic [facility] Signatures Title Initials  Time Lungs: [x] Clear    [] Course  [] Crackles  [] Wheezing [] Diminished [place] [address] Cardiac: [x] Regular   [] Irregular   [] Monitor  [] N/A  Rhythm: Edema:  [] None    [x] General     [] Facial   [] Pedal    [] UE    [] LE  
    Pain: [x]0  []1  []2   []3  []4   []5   []6   []7   []8   []9   []10 Post Treatment Note: 
 
 Pt tolerated treatment with no hemodialysis-related complications. Pt has no current c/o distress. Pt returned to room by transport team.  
  
 
POST TREATMENT PRIMARY NURSE HANDOFF REPORT:  
 
First initial/Last name/Title Post Dialysis: Nickolas Hernandez RN Time:  5527 Abbreviations: AVG-arterial venous graft, AVF-arterial venous fistula, IJ-Internal Jugular, Subcl-Subclavian, Fem-Femoral, Tx-treatment, AP/HR-apical heart rate, DFR-dialysate flow rate, BFR-blood flow rate, AP-arterial pressure, -venous pressure, UF-ultrafiltrate, TMP-transmembrane pressure, Dev-Venous, Art-Arterial, RO-Reverse Osmosis

## 2019-01-23 ENCOUNTER — APPOINTMENT (OUTPATIENT)
Dept: NON INVASIVE DIAGNOSTICS | Age: 79
DRG: 689 | End: 2019-01-23
Attending: HOSPITALIST
Payer: MEDICARE

## 2019-01-23 ENCOUNTER — APPOINTMENT (OUTPATIENT)
Dept: VASCULAR SURGERY | Age: 79
DRG: 689 | End: 2019-01-23
Attending: NURSE PRACTITIONER
Payer: MEDICARE

## 2019-01-23 ENCOUNTER — APPOINTMENT (OUTPATIENT)
Dept: CT IMAGING | Age: 79
DRG: 689 | End: 2019-01-23
Attending: NURSE PRACTITIONER
Payer: MEDICARE

## 2019-01-23 LAB
ANION GAP SERPL CALC-SCNC: 3 MMOL/L (ref 3–18)
ATRIAL RATE: 81 BPM
BASOPHILS # BLD: 0.1 K/UL (ref 0–0.1)
BASOPHILS NFR BLD: 1 % (ref 0–3)
BUN SERPL-MCNC: 19 MG/DL (ref 7–18)
BUN/CREAT SERPL: 4 (ref 12–20)
CALCIUM SERPL-MCNC: 9.6 MG/DL (ref 8.5–10.1)
CALCULATED P AXIS, ECG09: 24 DEGREES
CALCULATED R AXIS, ECG10: -14 DEGREES
CALCULATED T AXIS, ECG11: 12 DEGREES
CHLORIDE SERPL-SCNC: 104 MMOL/L (ref 100–108)
CHOLEST SERPL-MCNC: 150 MG/DL
CO2 SERPL-SCNC: 34 MMOL/L (ref 21–32)
CREAT SERPL-MCNC: 4.61 MG/DL (ref 0.6–1.3)
DIAGNOSIS, 93000: NORMAL
DIFFERENTIAL METHOD BLD: ABNORMAL
EOSINOPHIL # BLD: 0.1 K/UL (ref 0–0.4)
EOSINOPHIL NFR BLD: 1 % (ref 0–5)
ERYTHROCYTE [DISTWIDTH] IN BLOOD BY AUTOMATED COUNT: 14.3 % (ref 11.6–14.5)
EST. AVERAGE GLUCOSE BLD GHB EST-MCNC: ABNORMAL MG/DL
GLUCOSE BLD STRIP.AUTO-MCNC: 116 MG/DL (ref 70–110)
GLUCOSE BLD STRIP.AUTO-MCNC: 67 MG/DL (ref 70–110)
GLUCOSE BLD STRIP.AUTO-MCNC: 71 MG/DL (ref 70–110)
GLUCOSE BLD STRIP.AUTO-MCNC: 73 MG/DL (ref 70–110)
GLUCOSE BLD STRIP.AUTO-MCNC: 77 MG/DL (ref 70–110)
GLUCOSE BLD STRIP.AUTO-MCNC: 85 MG/DL (ref 70–110)
GLUCOSE BLD STRIP.AUTO-MCNC: 98 MG/DL (ref 70–110)
GLUCOSE SERPL-MCNC: 70 MG/DL (ref 74–99)
HBA1C MFR BLD: <3.5 % (ref 4.2–5.6)
HBV SURFACE AB SER QL IA: POSITIVE
HBV SURFACE AB SERPL IA-ACNC: 42.52 MIU/ML
HCT VFR BLD AUTO: 28.7 % (ref 35–45)
HDLC SERPL-MCNC: 71 MG/DL (ref 40–60)
HDLC SERPL: 2.1 {RATIO} (ref 0–5)
HEP BS AB COMMENT,HBSAC: NORMAL
HGB BLD-MCNC: 10.1 G/DL (ref 12–16)
LDLC SERPL CALC-MCNC: 63.6 MG/DL (ref 0–100)
LEFT CCA DIST DIAS: 6.1 CM/S
LEFT CCA DIST SYS: 55.6 CM/S
LEFT CCA MID DIAS: 0 CM/S
LEFT CCA MID SYS: 78.6 CM/S
LEFT CCA PROX DIAS: 0 CM/S
LEFT CCA PROX SYS: 79.7 CM/S
LEFT ECA DIAS: 0 CM/S
LEFT ECA SYS: 75.3 CM/S
LEFT VERTEBRAL DIAS: 7.2 CM/S
LEFT VERTEBRAL SYS: 50.1 CM/S
LIPID PROFILE,FLP: ABNORMAL
LYMPHOCYTES # BLD: 2.8 K/UL (ref 0.8–3.5)
LYMPHOCYTES NFR BLD: 32 % (ref 20–51)
MCH RBC QN AUTO: 31.9 PG (ref 24–34)
MCHC RBC AUTO-ENTMCNC: 35.2 G/DL (ref 31–37)
MCV RBC AUTO: 90.5 FL (ref 74–97)
MONOCYTES # BLD: 1.4 K/UL (ref 0–1)
MONOCYTES NFR BLD: 16 % (ref 2–9)
NEUTS SEG # BLD: 4.4 K/UL (ref 1.8–8)
NEUTS SEG NFR BLD: 50 % (ref 42–75)
P-R INTERVAL, ECG05: 168 MS
PLATELET # BLD AUTO: 407 K/UL (ref 135–420)
PLATELET COMMENTS,PCOM: ADEQUATE
PMV BLD AUTO: 10.2 FL (ref 9.2–11.8)
POTASSIUM SERPL-SCNC: 4.1 MMOL/L (ref 3.5–5.5)
Q-T INTERVAL, ECG07: 380 MS
QRS DURATION, ECG06: 84 MS
QTC CALCULATION (BEZET), ECG08: 441 MS
RBC # BLD AUTO: 3.17 M/UL (ref 4.2–5.3)
RBC MORPH BLD: ABNORMAL
RIGHT CCA DIST DIAS: 13.1 CM/S
RIGHT CCA DIST SYS: 54.8 CM/S
RIGHT CCA MID DIAS: 11.6 CM/S
RIGHT CCA MID SYS: 65.1 CM/S
RIGHT CCA PROX DIAS: 6.4 CM/S
RIGHT CCA PROX SYS: 93.8 CM/S
RIGHT ECA DIAS: 0 CM/S
RIGHT ECA SYS: 254.5 CM/S
RIGHT ICA DIST DIAS: 9.8 CM/S
RIGHT ICA DIST SYS: 51.5 CM/S
RIGHT ICA MID DIAS: 13.1 CM/S
RIGHT ICA MID SYS: 92.2 CM/S
RIGHT ICA PROX DIAS: 14.2 CM/S
RIGHT ICA PROX SYS: 74.6 CM/S
RIGHT ICA/CCA SYS: 1
RIGHT SUBCLAVIAN DIAS: 0 CM/S
RIGHT SUBCLAVIAN SYS: 79.7 CM/S
RIGHT VERTEBRAL DIAS: 8.7 CM/S
RIGHT VERTEBRAL SYS: 46 CM/S
SODIUM SERPL-SCNC: 141 MMOL/L (ref 136–145)
TRIGL SERPL-MCNC: 77 MG/DL (ref ?–150)
VENTRICULAR RATE, ECG03: 81 BPM
VLDLC SERPL CALC-MCNC: 15.4 MG/DL
WBC # BLD AUTO: 8.8 K/UL (ref 4.6–13.2)

## 2019-01-23 PROCEDURE — 74011000258 HC RX REV CODE- 258: Performed by: HOSPITALIST

## 2019-01-23 PROCEDURE — 80048 BASIC METABOLIC PNL TOTAL CA: CPT

## 2019-01-23 PROCEDURE — 74011250637 HC RX REV CODE- 250/637: Performed by: INTERNAL MEDICINE

## 2019-01-23 PROCEDURE — 74011000250 HC RX REV CODE- 250: Performed by: INTERNAL MEDICINE

## 2019-01-23 PROCEDURE — 82962 GLUCOSE BLOOD TEST: CPT

## 2019-01-23 PROCEDURE — 70450 CT HEAD/BRAIN W/O DYE: CPT

## 2019-01-23 PROCEDURE — 92610 EVALUATE SWALLOWING FUNCTION: CPT

## 2019-01-23 PROCEDURE — 92526 ORAL FUNCTION THERAPY: CPT

## 2019-01-23 PROCEDURE — 36415 COLL VENOUS BLD VENIPUNCTURE: CPT

## 2019-01-23 PROCEDURE — 85025 COMPLETE CBC W/AUTO DIFF WBC: CPT

## 2019-01-23 PROCEDURE — 74011250636 HC RX REV CODE- 250/636: Performed by: INTERNAL MEDICINE

## 2019-01-23 PROCEDURE — 74011000258 HC RX REV CODE- 258: Performed by: INTERNAL MEDICINE

## 2019-01-23 PROCEDURE — 97530 THERAPEUTIC ACTIVITIES: CPT

## 2019-01-23 PROCEDURE — 65660000000 HC RM CCU STEPDOWN

## 2019-01-23 PROCEDURE — 95816 EEG AWAKE AND DROWSY: CPT

## 2019-01-23 PROCEDURE — 97162 PT EVAL MOD COMPLEX 30 MIN: CPT

## 2019-01-23 PROCEDURE — 93880 EXTRACRANIAL BILAT STUDY: CPT

## 2019-01-23 PROCEDURE — 95819 EEG AWAKE AND ASLEEP: CPT | Performed by: NURSE PRACTITIONER

## 2019-01-23 PROCEDURE — 74011250637 HC RX REV CODE- 250/637: Performed by: HOSPITALIST

## 2019-01-23 RX ORDER — LEVETIRACETAM 500 MG/1
500 TABLET ORAL DAILY
Status: DISCONTINUED | OUTPATIENT
Start: 2019-01-24 | End: 2019-01-24 | Stop reason: HOSPADM

## 2019-01-23 RX ORDER — MAGNESIUM SULFATE 100 %
4 CRYSTALS MISCELLANEOUS AS NEEDED
Status: DISCONTINUED | OUTPATIENT
Start: 2019-01-23 | End: 2019-01-24 | Stop reason: HOSPADM

## 2019-01-23 RX ORDER — LEVETIRACETAM 500 MG/1
500 TABLET ORAL 2 TIMES DAILY
Status: DISCONTINUED | OUTPATIENT
Start: 2019-01-23 | End: 2019-01-23

## 2019-01-23 RX ORDER — DEXTROSE MONOHYDRATE 25 G/50ML
25 INJECTION, SOLUTION INTRAVENOUS AS NEEDED
Status: DISCONTINUED | OUTPATIENT
Start: 2019-01-23 | End: 2019-01-24 | Stop reason: HOSPADM

## 2019-01-23 RX ORDER — DEXTROSE MONOHYDRATE 25 G/50ML
INJECTION, SOLUTION INTRAVENOUS
Status: DISPENSED
Start: 2019-01-23 | End: 2019-01-23

## 2019-01-23 RX ORDER — LEVETIRACETAM 500 MG/1
500 TABLET ORAL
Status: DISCONTINUED | OUTPATIENT
Start: 2019-01-23 | End: 2019-01-24 | Stop reason: HOSPADM

## 2019-01-23 RX ADMIN — METOPROLOL TARTRATE 50 MG: 50 TABLET ORAL at 17:54

## 2019-01-23 RX ADMIN — LOSARTAN POTASSIUM 25 MG: 50 TABLET ORAL at 14:47

## 2019-01-23 RX ADMIN — METOPROLOL TARTRATE 50 MG: 50 TABLET ORAL at 09:59

## 2019-01-23 RX ADMIN — FOLIC ACID: 5 INJECTION, SOLUTION INTRAMUSCULAR; INTRAVENOUS; SUBCUTANEOUS at 17:54

## 2019-01-23 RX ADMIN — CEFTRIAXONE 2 G: 2 INJECTION, POWDER, FOR SOLUTION INTRAMUSCULAR; INTRAVENOUS at 17:54

## 2019-01-23 RX ADMIN — DEXTROSE MONOHYDRATE 12.5 G: 25 INJECTION, SOLUTION INTRAVENOUS at 03:14

## 2019-01-23 RX ADMIN — ASPIRIN 325 MG ORAL TABLET 325 MG: 325 PILL ORAL at 09:59

## 2019-01-23 RX ADMIN — SODIUM BICARBONATE 650 MG TABLET 325 MG: at 09:59

## 2019-01-23 RX ADMIN — NIFEDIPINE 60 MG: 30 TABLET, FILM COATED, EXTENDED RELEASE ORAL at 14:45

## 2019-01-23 RX ADMIN — HYDRALAZINE HYDROCHLORIDE 50 MG: 50 TABLET, FILM COATED ORAL at 09:58

## 2019-01-23 NOTE — PROGRESS NOTES
Hospitalist Progress Note Brigitte Garcias MD 
Internal medicine/ Hospitalist 
 
Daily Progress Note: 1/23/2019    
Interval history / Subjective:  
Dimple Jameson is a 70 y.o.  female with h/o CVA,diastolic chf,PAD,ESRD on HD,MDS,OA,asthma,diabetes,hypertension,presented to ED because of left facial droop and difficulty talking. Patient is awake,talking but her speech is difficult to [facility] the history mostly obtained from ED attending who first came to contact with patient. Per report,patient went to dialysis today, was noted to have left facial droop and difficulty talking. According to report,patient's speech is normally clear. In ED,CT head did not show any acute process,only noted was a possible age-indeterminate superior left basal ganglia infarct, but nonspecific. It was recommended to admit patient for possible ischemic stroke. Patient was also noted to have UTI. 
  
 
Current Facility-Administered Medications Medication Dose Route Frequency  glucose chewable tablet 16 g  4 Tab Oral PRN  
 glucagon (GLUCAGEN) injection 1 mg  1 mg IntraMUSCular PRN  
 dextrose (D50W) injection syrg 12.5 g  25 mL IntraVENous PRN  
 dextrose (D50) infusion  hydrALAZINE (APRESOLINE) tablet 50 mg  50 mg Oral BID  losartan (COZAAR) tablet 25 mg  25 mg Oral DAILY  metoprolol tartrate (LOPRESSOR) tablet 50 mg  50 mg Oral BID  
 NIFEdipine ER (PROCARDIA XL) tablet 60 mg  60 mg Oral DAILY  pantoprazole (PROTONIX) tablet 40 mg  40 mg Oral ACB  oxyCODONE-acetaminophen (PERCOCET) 5-325 mg per tablet 1 Tab  1 Tab Oral Q6H PRN  prednisoLONE acetate (PRED FORTE) 1 % ophthalmic suspension 1 Drop  1 Drop Both Eyes QID  sodium bicarbonate tablet 325 mg  325 mg Oral QID  
 0.9% sodium chloride infusion  100 mL/hr IntraVENous DIALYSIS PRN  
 ondansetron (ZOFRAN) injection 2 mg  2 mg IntraVENous Q6H PRN  
 aspirin tablet 325 mg  325 mg Oral DAILY  atorvastatin (LIPITOR) tablet 80 mg  80 mg Oral QHS  acetaminophen (TYLENOL) tablet 650 mg  650 mg Oral Q4H PRN  
 senna-docusate (PERICOLACE) 8.6-50 mg per tablet 2 Tab  2 Tab Oral QHS  albuterol (PROVENTIL VENTOLIN) nebulizer solution 2.5 mg  2.5 mg Nebulization T6H PRN  
 folic acid (FOLVITE) 1 mg, thiamine (B-1) 100 mg in 0.9% sodium chloride 50 mL ivpb   IntraVENous ACD  cefTRIAXone (ROCEPHIN) 2 g in 0.9% sodium chloride (MBP/ADV) 50 mL MBP  2 g IntraVENous Q24H  
 [START ON 2019] epoetin emerson (EPOGEN;PROCRIT) injection 2,720 Units  50 Units/kg IntraVENous DIALYSIS TUE, THU & SAT Review of Systems No complaint. Objective:  
 
Visit Vitals /74 (BP 1 Location: Left arm, BP Patient Position: At rest) Pulse 78 Temp 97.7 °F (36.5 °C) Resp 15 Ht 5' 5\" (1.651 m) Wt 54.4 kg (120 lb) SpO2 96% BMI 19.97 kg/m² Temp (24hrs), Av.7 °F (36.5 °C), Min:97.4 °F (36.3 °C), Max:98.4 °F (36.9 °C) No intake/output data recorded. No intake/output data recorded. PHYSICAL EXAM:  
General:          Lying in bed,not in distress. HEENT:           Pupils equal.  Sclera anicteric. Mouth dry. Neck:               Supple. No JVD 
CV:                  Regular rate and rhythm. Lungs:             CTAb Abdomen:        Soft, non-tender. BS present Extremities:     No cyanosis. No edema. No clubbing Neurologic:      Alert and oriented X 3. No facial droop. Contracture right hand - chronic. Skin:                Warm and dry. No rashes. Data Review Recent Results (from the past 12 hour(s)) GLUCOSE, POC Collection Time: 19  1:13 AM  
Result Value Ref Range Glucose (POC) 71 70 - 110 mg/dL GLUCOSE, POC Collection Time: 19  3:00 AM  
Result Value Ref Range Glucose (POC) 67 (L) 70 - 110 mg/dL GLUCOSE, POC Collection Time: 19  3:33 AM  
Result Value Ref Range Glucose (POC) 98 70 - 110 mg/dL CBC WITH AUTOMATED DIFF Collection Time: 19  4:30 AM  
Result Value Ref Range WBC 8.8 4.6 - 13.2 K/uL  
 RBC 3.17 (L) 4.20 - 5.30 M/uL  
 HGB 10.1 (L) 12.0 - 16.0 g/dL HCT 28.7 (L) 35.0 - 45.0 % MCV 90.5 74.0 - 97.0 FL  
 MCH 31.9 24.0 - 34.0 PG  
 MCHC 35.2 31.0 - 37.0 g/dL  
 RDW 14.3 11.6 - 14.5 % PLATELET 883 275 - 441 K/uL MPV 10.2 9.2 - 11.8 FL  
 NEUTROPHILS 50 42 - 75 % LYMPHOCYTES 32 20 - 51 % MONOCYTES 16 (H) 2 - 9 % EOSINOPHILS 1 0 - 5 % BASOPHILS 1 0 - 3 %  
 ABS. NEUTROPHILS 4.4 1.8 - 8.0 K/UL  
 ABS. LYMPHOCYTES 2.8 0.8 - 3.5 K/UL  
 ABS. MONOCYTES 1.4 (H) 0 - 1.0 K/UL  
 ABS. EOSINOPHILS 0.1 0.0 - 0.4 K/UL  
 ABS. BASOPHILS 0.1 0.0 - 0.1 K/UL PLATELET COMMENTS ADEQUATE    
 RBC COMMENTS TARGET CELLS 1+ 
    
 DF MANUAL METABOLIC PANEL, BASIC Collection Time: 01/23/19  4:30 AM  
Result Value Ref Range Sodium 141 136 - 145 mmol/L Potassium 4.1 3.5 - 5.5 mmol/L Chloride 104 100 - 108 mmol/L  
 CO2 34 (H) 21 - 32 mmol/L Anion gap 3 3.0 - 18 mmol/L Glucose 70 (L) 74 - 99 mg/dL BUN 19 (H) 7.0 - 18 MG/DL Creatinine 4.61 (H) 0.6 - 1.3 MG/DL  
 BUN/Creatinine ratio 4 (L) 12 - 20 GFR est AA 11 (L) >60 ml/min/1.73m2 GFR est non-AA 9 (L) >60 ml/min/1.73m2 Calcium 9.6 8.5 - 10.1 MG/DL  
GLUCOSE, POC Collection Time: 01/23/19  5:24 AM  
Result Value Ref Range Glucose (POC) 77 70 - 110 mg/dL Assessment/Plan:  
 
Principal Problem: 
  Ischemic stroke ([address] (1/22/2019) Active Problems: 
  UTI (urinary tract infection) (1/22/2019) ESR[facility]end stage [facility] disease[facility] (1/22/2019) OA (osteoarthritis) (1/22/2019) Diastolic CHF, chronic ([facility] (1/22/2019) PAD (peripheral artery disease) ([facility] (1/22/2019) Care Plan 1. Ischemic stroke 
 -CT head c/w possible age-indeterminate superior left basal ganglia infarct, but 
nonspecific and difficult to separate from adjacent deep hemispheric white 
matter hypodensity. No definite CT evidence of acute cortical infarct is seen 
-Aspirin 
-Neuro-checks -Permissive hypertension 
-MRI pending -ECHO pending 
-Neurology consulted,will follow recommendations -PT/OT/Speech 
  
2. UTI 
-Ceftriaxone 
-Blood cx ngtd 
-Ucx pending results 
  
3. ESRD 
 -Undergoing dialysis now. Nephrology consult 
  
4. Diastolic HF 
5. PAD 6. Diabetes 
-Resume other meds for her chronic medical problems. 7. HTN  
-Permissive hypertension 
  
DVT prophylaxis:scds Full code Disposition:tbd

## 2019-01-23 NOTE — PROCEDURES
[address], [facility]                             ELECTROENCEPHALOGRAM    PATIENT:     Reba Qureshi   : 1940  CSN:           [id number]       MRN:           [id number]  PROCEDURE: EEG      PROCEDURE DATE:  19        HISTORY: This is a 70year old female with a history of seizures admitted for possible encephalopathy and rule out stroke. TECHNICAL DESCRIPTION:   This digital EEG was recorded using 21 scalp and ear and 2 EKG electrodes. It was reviewed in referential and bipolar montages following the 10-20 International Electrode  Placement System. The background was characterized by an 8 hz posterior dominant rhythm and a 4-6 hz diffuse theta rhythm. Sleep was not achieve. There were no epileptiform discharges. PHOTIC STIMULATION:   Stepped photic stimulation was performed from 1 to 30 Hz the patient had symmetric occipital driving from 3 to 12 Hz. HYPERVENTILATION: was not performed     EKG: The patient was in normal sinus rhythm and no ectopic beats were seen. IMPRESSION:   This is an abormal EEG. The diffuse background slowing is consistent with encephalopathy. No seizures or epileptiform discharges were noted.        Brenden Perez MD  Neurology  3:04 PM, 19

## 2019-01-23 NOTE — PROGRESS NOTES
Problem: Dysphagia (Adult) Goal: *Acute Goals and Plan of Care (Insert Text) Recommendations: 
Diet: soft/thin Meds: one at a time Aspiration Precautions Oral Care TID Goals:  Patient will: 1. Tolerate PO trials with 0 s/s overt distress in 4/5 trials 2. Utilize compensatory swallow strategies/maneuvers (decrease bite/sip, size/rate, alt. liq/sol) with min cues in 4/5 trials Outcome: Progressing Towards Goal 
 
Speech LAnguage Pathology bedside swallow  
evaluation & TREATMENT Patient: Mark Anthony Arevalo (50 y.o. female) Date: 1/23/2019 Primary Diagnosis: Ischemic stroke ([facility] Precautions: aspiration  Fall PLOF: regular diet ASSESSMENT : 
Based on the objective data described below, the patient presents with mild OP dysphagia in the setting of stroke. Pt alert and pleasantly confused. Oral-motor exam revealed pt edentulous; however, all other structures grossly intact for mastication and deglutition. Pt accepted consecutive swallows of thin liquids + straw without aspiration s/s. Puree and solid accepted with labored oral bolus prep and transit; adequate swallow timing and hyolaryngeal excursion to palpation. Lingual residue noted; With increased time, 100% oral bolus clearance appreciated. At this time, pt safest for soft solid/thin liquid diet; meds one at a time. D/w RN, Eden Villalobos. SLP to follow 1-2 visits for diet tolerance and education as indicated. Skilled therapy initiated with min verbal cues for decreasing bite/sip size and lingual wash to facilitate oral clearance. Educated pt on aspiration precautions and importance of compensatory swallow techniques to decrease aspiration risk (decrease rate of intake & sip/bite size, upright @HOB for all po intake and ~30 minutes after po); requires reinfocement. Patient will benefit from skilled intervention to address the above impairments. Patients rehabilitation potential is considered to be Good Factors which may influence rehabilitation potential include:  
[]            None noted [x]            Mental ability/status []            Medical condition []            Home/family situation and support systems 
[]            Safety awareness 
[]            Pain tolerance/management []            Other: PLAN : 
Recommendations and Planned Interventions: 
Soft/thin Frequency/Duration: Patient will be followed by speech-language pathology 1-2 times per day/4-7 days per week to address goals. Discharge Recommendations: Home Health SUBJECTIVE:  
Patient stated I'm so hungry. OBJECTIVE:  
 
Past Medical History:  
Diagnosis Date  Acquired hypothyroidism   
 patient denies  Asthma  Bilateral cataracts  Chronic kidney disease  Diabetes mellitus  Essential hypertension  H/O drug abuse Heroin  Kidney stone Past Surgical History:  
Procedure Laterality Date  HX COLONOSCOPY    
 HX ENDOSCOPY  6/10/2010  HX HYSTERECTOMY  HX LITHOTRIPSY  HX OTHER SURGICAL Cochlear implant  HX OTHER SURGICAL  6/14/2011 Bone marrow biopsy Prior Level of Function/Home Situation: regular/thin liquid diet Diet prior to admission: regular/thin Current Diet:  Soft/thin Cognitive and Communication Status: 
Neurologic State: Alert Orientation Level: Oriented to person, Disoriented to time, Disoriented to situation, Disoriented to place Cognition: Decreased command following, Decreased attention/concentration Perception: Appears intact Perseveration: No perseveration noted Safety/Judgement: Fall prevention Oral Assessment: 
Oral Assessment Labial: No impairment Dentition: Edentulous Oral Hygiene: [address] Lingual: Decreased strength Velum: No impairment Mandible: No impairment P.O. Trials: 
Patient Position: XBV 11 Vocal quality prior to P.O.: Low volume Consistency Presented: Thin liquid; Solid;Pudding How Presented: Self-fed/presented;SLP-fed/presented;Straw;Successive swallows Bolus Acceptance: No impairment Bolus Formation/Control: Impaired Type of Impairment: Delayed;Mastication Propulsion: Delayed (# of seconds); Discoordination Oral Residue: Lingual;10-50% of bolus Initiation of Swallow: No impairment Laryngeal Elevation: Functional 
Aspiration Signs/Symptoms: None Pharyngeal Phase Characteristics: Poor endurance Effective Modifications: Small sips and bites Cues for Modifications: Minimal 
  
Oral Phase Severity: Minimal 
Pharyngeal Phase Severity : Minimal 
The severity rating is based on the following outcomes: CELI Noms Swallow Level 6 PAIN: 
Start of Eval/Tx: 0 End of Eval/Tx: 0 After treatment:  
[]            Patient left in no apparent distress sitting up in chair 
[x]            Patient left in no apparent distress in bed 
[x]            Call bell left within reach [x]            Nursing notified []            Family present 
[]            Caregiver present 
[]            Bed alarm activated COMMUNICATION/EDUCATION:  
[x]            Aspiration precautions; swallow safety; compensatory techniques. [x]            Patient/family have participated as able in goal setting and plan of care. [x]            Patient/family agree to work toward stated goals and plan of care. []            Patient understands intent and goals of therapy; neutral about participation. [x]            Patient unable to participate in goal setting/plan of care; educ ongoing with interdisciplinary staff 
[]         Posted safety precautions in patient's room. Thank you for this referral. 
SUSHANT Wilson Time Calculation: 35 mins Evaluation Time: 20 minutes Treatment Time: 15 minutes

## 2019-01-23 NOTE — PROGRESS NOTES
Nephrology  Daily Progress Note Impression: 
ES[facility]- HD [facility] 
            [facility]             -PCC 52.5 
            -Access: Right leg graft Admitted w/ CVA UTI Hx of diverticulosis h/o MDS Anemia of ESRD Hearing impairment Mineral bone disorder PAD Sickle cell trait Chronic HepC HTN 
HLD Seizure disorder 
  
Plan: 
-HD tomorrow; no need for HD today  
-Epoetin; Recommend prn transfusion to maintain hgb >7 
-renal diet 
-stopped PO bicarbonate.  
-discussed with pt Patient Active Problem List  
Diagnosis Code  Nocturia R35.1  Incomplete bladder emptying R33.9  Recurrent UTI N39.0  Atrophic vaginitis N95.2  Microhematuria R31.29  
 Chronic kidney disease N18.9  
 Ischemic stroke (HCC) I63.9  
 UTI (urinary tract infection) N39.0  ESRD (end stage renal disease) ([facility] N18.6  OA (osteoarthritis) E45.87  
 Diastolic CHF, chronic (HCC) I50.32  
 PAD (peripheral artery disease) (HCC) I73.9 Subjective: No new complaints. Had HD yesterday w/o UF. ROS difficult. Was about to get EEG when I saw her. Had CT head earlier today as well. Physical Exam: 
Patient Vitals for the past 24 hrs: 
 Temp Pulse Resp BP SpO2  
01/23/19 0938 97.7 °F (36.5 °C) 78 15 172/74 96 % 01/23/19 0530 97.5 °F (36.4 °C) 74 16 173/55 95 % 01/23/19 0330 97.5 °F (36.4 °C) 72 16 166/55 97 % 01/23/19 0130 97.6 °F (36.4 °C) 73 18 162/68 91 % 01/22/19 2330 97.5 °F (36.4 °C) 77 16 176/76 90 % 01/22/19 2216     92 % 01/22/19 2130 97.7 °F (36.5 °C) 77 14 164/65 92 % 01/22/19 2057 97.5 °F (36.4 °C) 73 16 169/72 92 % 01/22/19 1857 98 °F (36.7 °C) 75 16 178/70 92 % 01/22/19 1630 97.7 °F (36.5 °C) 72 18 117/68   
01/22/19 1615  78 18 134/74   
01/22/19 1600  80 18 138/75   
01/22/19 1545  79 18 137/75   
01/22/19 1530  80 18 140/72   
01/22/19 1515  81 18 125/62   
01/22/19 1500  82 18 112/82   
01/22/19 1445  81 18 119/86  01/22/19 1430  83 18 127/73   
01/22/19 1415  80 18 138/72   
01/22/19 1400  69 18 135/66   
01/22/19 1345  80 18 132/70   
01/22/19 1330  81 18 135/71   
01/22/19 1315  76 18 142/74   
01/22/19 1310 97.4 °F (36.3 °C) 79 18 161/77   
01/22/19 1220  84 19 155/62 98 % Wt Readings from Last 4 Encounters:  
01/22/19 54.4 kg (120 lb)  
09/26/16 53.5 kg (118 lb)  
02/25/14 67.6 kg (149 lb)  
01/20/14 67.6 kg (149 lb) Intake/Output Summary (Last 24 hours) at 1/23/2019 1215 Last data filed at 1/22/2019 1615 Gross per 24 hour Intake  Output 0 ml Net 0 ml OP clear CV: RRR Lungs: Clear Abd: Soft, Nontender Extrem: No edema Labs CBC w/Diff Recent Labs  
  01/23/19 
0430 01/22/19 
1040 WBC 8.8 11.8  
RBC 3.17* 3.65* HGB 10.1* 11.7* HCT 28.7* 34.0*  
 477* GRANS 50 61 LYMPH 32 26 EOS 1 1 Chemistry Recent Labs  
  01/23/19 
0430 01/22/19 
1040 GLU 70* 85  142  
K 4.1 4.9  100 CO2 34* 35* BUN 19* 44* CREA 4.61* 8.90* CA 9.6 10.2* AGAP 3 7 BUCR 4* 5* AP  --  102 TP  --  6.6 ALB  --  2.7*  
GLOB  --  3.9 AGRAT  --  0.7* No results found for: IRON, FE, TIBC, IBCT, PSAT, FERR Lab Results Component Value Date/Time Calcium 9.6 01/23/2019 04:30 AM  
  
 
 
Meds reviewed in [facility] Current Facility-Administered Medications Medication Dose Route Frequency  glucose chewable tablet 16 g  4 Tab Oral PRN  
 glucagon (GLUCAGEN) injection 1 mg  1 mg IntraMUSCular PRN  
 dextrose (D50) infusion 12.5 g  25 mL IntraVENous PRN  
 dextrose (D50) infusion  levETIRAcetam (KEPPRA) tablet 500 mg  500 mg Oral BID  levETIRAcetam (KEPPRA) tablet 500 mg  500 mg Oral DIALYSIS PRN  
 hydrALAZINE (APRESOLINE) tablet 50 mg  50 mg Oral BID  losartan (COZAAR) tablet 25 mg  25 mg Oral DAILY  metoprolol tartrate (LOPRESSOR) tablet 50 mg  50 mg Oral BID  
  NIFEdipine ER (PROCARDIA XL) tablet 60 mg  60 mg Oral DAILY  pantoprazole (PROTONIX) tablet 40 mg  40 mg Oral ACB  oxyCODONE-acetaminophen (PERCOCET) 5-325 mg per tablet 1 Tab  1 Tab Oral Q6H PRN  prednisoLONE acetate (PRED FORTE) 1 % ophthalmic suspension 1 Drop  1 Drop Both Eyes QID  sodium bicarbonate tablet 325 mg  325 mg Oral QID  
 0.9% sodium chloride infusion  100 mL/hr IntraVENous DIALYSIS PRN  
 ondansetron (ZOFRAN) injection 2 mg  2 mg IntraVENous Q6H PRN  
 aspirin tablet 325 mg  325 mg Oral DAILY  acetaminophen (TYLENOL) tablet 650 mg  650 mg Oral Q4H PRN  
 senna-docusate (PERICOLACE) 8.6-50 mg per tablet 2 Tab  2 Tab Oral QHS  albuterol (PROVENTIL VENTOLIN) nebulizer solution 2.5 mg  2.5 mg Nebulization H3B PRN  
 folic acid (FOLVITE) 1 mg, thiamine (B-1) 100 mg in 0.9% sodium chloride 50 mL ivpb   IntraVENous ACD  cefTRIAXone (ROCEPHIN) 2 g in 0.9% sodium chloride (MBP/ADV) 50 mL MBP  2 g IntraVENous Q24H  
 [START ON 1/24/2019] epoetin emerson (EPOGEN;PROCRIT) injection 2,720 Units  50 Units/kg IntraVENous DIALYSIS NAVARRO KAY & SAT Thank Karli Pace MD 
[facility]

## 2019-01-23 NOTE — PROGRESS NOTES
Nutrition initial assessment/Plan of care RECOMMENDATIONS:  
1. Renal Soft Solids Diet (NDD3) 2. Monitor labs, weight and PO intake 3. RD to follow GOALS:  
1. PO intake meets >75% of protein/calorie needs by 1/28 2. Weight Maintenance (+/- 1-2 lb) by 1/30 ASSESSMENT:  
Wt status is classified as normal per BMI of 20. However, Pt at nutrition risk d/t BMI <23 and age >65 years. Adequate PO intake. Labs noted. BG range (67-98) over the past 24 hours. Pt w/ hypoalbuminemeia, and elevated BUN/Cr; GFR (11). Nutrition recommendations listed. RD to follow. Nutrition Diagnoses: Altered nutrition related lab values related to CKD as evidenced by elevated BUN/Cr; GFR (11). Nutrition Risk:  [] High  [x] Moderate []  Low SUBJECTIVE/OBJECTIVE:  
Pt admitted for ischemic stroke. PT has ESRD on HD. SLP following and recommended Soft Solids/Thin Liquids. Weight appears to be stable per documented weight records. Appetite/PO intake good per d/w RN, Ortega Valencia. Attempted to see Pt x 3 but off floor. Will follow up. Information Obtained from:  
 [x] Chart Review [x] Patient 
 [] Family/Caregiver 
 [] Nurse/Physician 
 [] Interdisciplinary Meeting/Rounds Diet: Renal Soft Solids Diet (NDD3) Medications: [x] Reviewed Allergies: [x] Reviewed Encounter Diagnoses ICD-10-CM ICD-9-CM 1. Dysarthria R47.1 784.51  
2. ESRD on hemodialysis (HCC) N18.6 585.6 Z99.2 V45.11 Past Medical History:  
Diagnosis Date  Acquired hypothyroidism   
 patient denies  Asthma  Bilateral cataracts  Chronic kidney disease  Diabetes mellitus  Essential hypertension  H/O drug abuse Heroin  Kidney stone Labs:   
Lab Results Component Value Date/Time  Sodium 141 01/23/2019 04:30 AM  
 Potassium 4.1 01/23/2019 04:30 AM  
 Chloride 104 01/23/2019 04:30 AM  
 CO2 34 (H) 01/23/2019 04:30 AM  
 Anion gap 3 01/23/2019 04:30 AM  
 Glucose 70 (L) 01/23/2019 04:30 AM  
 BUN 19 (H) 01/23/2019 04:30 AM  
 Creatinine 4.61 (H) 01/23/2019 04:30 AM  
 Calcium 9.6 01/23/2019 04:30 AM  
 Albumin 2.7 (L) 01/22/2019 10:40 AM  
 
Anthropometrics: BMI (calculated): 20 Last 3 Recorded Weights in this Encounter 01/22/19 1028 Weight: 54.4 kg (120 lb) Ht Readings from Last 1 Encounters:  
01/22/19 5' 5\" (1.651 m) Weight Metrics 1/22/2019 9/26/2016 2/25/2014 1/20/2014 2/8/2012 Weight 120 lb 118 lb 149 lb 149 lb 153 lb BMI 19.97 kg/m2 20.9 kg/m2 26.4 kg/m2 26.4 kg/m2 27.11 kg/m2 No data found. IBW: 125 lb %IBW: 96% UBW: 118-120 lb 
[] Weight Loss [] Weight Gain [x] Weight Stable Estimated Nutrition Needs: [x] MSJ  [] Other: 
Calories: 6716-2061 kcal Based on:  [x] Actual BW Protein:   65-76 g Based on:   [x] Actual BW Fluid:       UO + 1000 ml Based on:   [x] Actual BW  
 
 [x] No Cultural, Alevism or ethnic dietary need identified. [] Cultural, Alevism and ethnic food preferences identified and addressed Wt Status:  [x] Normal (18.6 - 24.9) [] Underweight (<18.5) [] Overweight (25 - 29.9) [] Mild Obesity (30 - 34.9)  [] Moderate Obesity (35 - 39.9) [] Morbid Obesity (40+) Nutrition Problems Identified:  
[] Suboptimal PO intake  
[] Food Allergies [x] Difficulty chewing/swallowing/poor dentition 
[] Constipation/Diarrhea  
[] Nausea/Vomiting  
[] None 
[] Other:  
 
Plan:  
[x] Therapeutic Diet 
[]  Obtained/adjusted food preferences/tolerances and/or snacks options  
[]  Supplements added  
[] Occupational therapy following for feeding techniques []  HS snack added  
[x]  Modify diet texture  
[]  Modify diet for food allergies []  Educate patient  
[]  Assist with menu selection  
[x]  Monitor PO intake on meal rounds  
[x]  Continue inpatient monitoring and intervention  
[]  Participated in discharge planning/Interdisciplinary rounds/Team meetings  
[]  Other:  
 
Education Needs: 
 [] Not appropriate for teaching at this time due to: [x] Identified and addressed Nutrition Monitoring and Evaluation: 
[x] Continue ongoing monitoring and intervention 
[] Other [place]

## 2019-01-23 NOTE — PROGRESS NOTES
1930: Assumed patient care. [facility] ([facility] Report included SBAR, Kardex, and MAR. Patient resting in bed, in no signs of pain or distress. Call light and possessions within reach. Bed in lowest setting.  
 
2200: Unable to complete MRI screening form as patient is not oriented x 4 
 
 
0030: Patient attempting to exit bed, stating \"I'm waiting for the man to take me downstairs, I want to get dressed\" redirected patient to bed, reoriented to time and situation 0118: patient's blood sugar 71; history of diabetes; NPO due to failing swallow evaluation; no fluids; paged dr. Javon Kelly: Repaged Dr. Zoie Rodríguez: Repaged Dr. Kiley Brito: Repaged Dr. Medina Matta  
 
[id number]: Informed Dr. Medina Matta of patient's blood sugar as well as CHF, DM II, and dialysis history; Doctor ordered to give an amp of D50; RBV   
0333: Recheck: 98; will continue to monitor

## 2019-01-23 NOTE — CONSULTS
Neurology Consult Note    Admit Date: 1/22/2019  Length of Stay: 1  Primary Care: Lane Espinosa MD   Principle Problem: Ischemic stroke [facility])     Assessment:    Encephalopathy secondary UTI and metabolic degrangements  Seizure  Stroke    Plan:    1. Ceftriaxone in place for UTI. Nephrology following for HD. Continue to correct metabolic abnormalities. 2. Resuming Keppra. Will need replacement dosing post dialysis. Seizure precautions. EEG today. CK ok. 3. ASA and atorvastatin in place. LDL 61.  Was not on statin PTA. Discontinuing statin, LDL within range without medication. Unable to obtain MRI with cochlear implant. Will repeat CT today. If does show acute stroke, will need to add plavix for 3 weeks, get carotids, and ECHO. History: Cristobal Harris is a 80 yo AAF that appears older than recorded age with PMH of seizure disorder, stroke, CHF, PAD, ESRD on HD TRS, OA, asthma, DM, and DM who presented from dialysis with stroke like symptoms. History obtained from chart and from Ms. Hodgson (emergency contact). Ms. Nicolas Figueroa is a resident at [facility] and had been sent for her dialysis treatment. Ms. Katty Lucio states she received a call just stating that Ms. Elizalde \"Did not look good\". She had been in rehab since December 24th from a DC at [facility] for stroke and fall. She does confirm that Ms. Nicolas Figueroa has a seizure disorder but does not know what the seizures present as or what medications she was on for them. Review of the [facility] record at that time has Keppra 500mg BID. EMS records denote left facial droop and right arm drift. UA obtained here shows +Nitrates and large leukocyte esterase.         Results reviewed:     CT Results (most recent):  Results from Hospital Encounter encounter on 01/22/19   CT HEAD WO CONT    Narrative EXAM: CT HEAD W/O CONTRAST    INDICATION: Left facial droop, right-sided weakness    COMPARISON: No prior comparison study.    TECHNIQUE: Axial CT imaging of the head was performed without intravenous  contrast.  Additional coronal and sagittal reconstructions were performed. One  or more dose reduction techniques were used on this CT: automated exposure  control, adjustment of the mAs and/or kVp according to patient's size, and  iterative reconstruction techniques. The specific techniques utilized on this CT  exam have been documented in the patient's electronic medical record. Digital  Imaging and Communications in Medicine (DICOM) format image data are available  to nonaffiliated external healthcare facilities or entities on a secure, media  free, reciprocally searchable basis with patient authorization for at least a  12-month period after this study. _______________    FINDINGS:    There is significant streak artifact produced by a posterior left  temporoparietal electronic device related to the cochlear implant. VENTRICLES/EXTRA-AXIAL SPACES: The ventricles and sulci are mildly enlarged  consistent with diffuse volume loss. Benign-appearing dural calcifications are  seen along the interhemispheric falx. BRAIN PARENCHYMA: There is no evidence of acute intracranial hemorrhage, mass  effect, midline shift, or herniation. Vague asymmetric hypodensity suggested  along the superior left basal ganglia and corona radiata, axial images 17-18. A  very mild amount of hypodense white matter lesions are seen within the  periventricular and subcortical white matter which are nonspecific, but likely  represent chronic small vessel changes. ORBITS: The bilateral lenses are absent, likely due to prior cataract surgery. PARANASAL SINUSES/MASTOIDS: Visualized paranasal sinuses are clear. Visualized  mastoid air cells are clear. OSSEOUS STRUCTURES: Postsurgical changes from partial right-sided mastoidectomy  is present with placement of cochlear implant lead.  The lead does connect to a  superficial scalp metallic device in the right posterior temporal-parietal  region. Accompanying considerable scatter artifact is present. OTHER: None.      _______________      Impression IMPRESSION:    Study degraded by considerable streak artifact related to cochlear implant  device. 1. No acute intracranial hemorrhage, mass effect, midline shift, or herniation. 2. Very mild nonspecific white matter disease likely representing chronic small  vessel changes. 3. Possible age-indeterminate superior left basal ganglia infarct, but  nonspecific and difficult to separate from adjacent deep hemispheric white  matter hypodensity. No definite CT evidence of acute cortical infarct is seen. Please note that noncontrast head CT may be normal in early acute infarct. CRITICAL RESULT:    These critical results on this CODE S patient were directly communicated to Dr. Kishan Delarosa on 1/22/2019 10:31 AM.  Results understood and acknowledged. MRI Results (most recent):  No results found for this or any previous visit.     Latest Hemoglobin A1C:  Lab Results   Component Value Date/Time    Hemoglobin A1c <3.5 (L) 01/22/2019 10:40 AM       Latest Cardiology Procedure:  Results for orders placed or performed during the hospital encounter of 01/22/19   EKG, 12 LEAD, INITIAL   Result Value Ref Range    Ventricular Rate 81 BPM    Atrial Rate 81 BPM    P-R Interval 168 ms    QRS Duration 84 ms    Q-T Interval 380 ms    QTC Calculation (Bezet) 441 ms    Calculated P Axis 24 degrees    Calculated R Axis -14 degrees    Calculated T Axis 12 degrees    Diagnosis       Normal sinus rhythm  Possible Left atrial enlargement  Left ventricular hypertrophy  Abnormal ECG  When compared with ECG of 26-SEP-2016 14:15,  Nonspecific T wave abnormality now evident in Anterior leads  QT has shortened  Confirmed by Rain Carroll ([id number]) on 1/23/2019 10:08:46 AM         Important Labs:  No results found for: FOL, RBCF  Lab Results   Component Value Date/Time    Cholesterol, total 150 01/22/2019 10:40 AM    HDL Cholesterol 71 (H) 01/22/2019 10:40 AM    LDL, calculated 63.6 01/22/2019 10:40 AM    VLDL, calculated 15.4 01/22/2019 10:40 AM    Triglyceride 77 01/22/2019 10:40 AM    CHOL/HDL Ratio 2.1 01/22/2019 10:40 AM     Lab Results   Component Value Date/Time    TSH 1.20 01/22/2019 10:40 AM    Triiodothyronine (T3), free 1.6 (L) 01/22/2019 10:40 AM    T4, Free 1.0 01/22/2019 10:40 AM     No results found for: DS35, PHEN, PHENO, PHENT, DILF, DS39, PHENY, PTN, VALF2, VALAC, VALP, VALPR, DS6, CRBAM, CRBAMP, CARB2, XCRBAM  Discussed with: Emergency contact MS. Hodgson and nurse    Allergies:    Allergies   Allergen Reactions    Sulfa (Sulfonamide Antibiotics) Itching      Review of Systems: Unable to obtain   Y  N   Constitutional: [] [] recent weight change  [] [] fever  [] [] sleep difficulties   ENT/Mouth:  [] [] hearing loss  [] [] swallowing problems  [] [] slurred speech   Cardiovascular:  [] [] chest pain   [] [] palpitations    Respiratory: [] [] cough with swallow  [] [] shortness of breath  [] [] sleep apnea  [] [] intubated   Gastrointestinal: [] [] abdominal pain  [] [] nausea   Genitourinary: [] [] frequent urination  [] [] incontinence    Musculoskeletal:   [] [] joint pain  [] [] muscle pain   Integument:   [] [] rash/itching   Neurological:  [] [] dizziness/vertigo  [] [] sedation  [] [] confusion  [] [] agitation/combativeness  [] [] loss of consciousness  [] [] numbness/tingling sensation  [] [] tremors  [] [] weakness in limbs  [] [] difficulty with balance  [] [] frequent or recurring headaches  [] [] memory loss   [] [] comatose  [] [] seizures   Psychiatric:   [] [] depression  [] [] hallucinations   Endocrine: [] [] excessive thirst or urination   [] [] heat or cold intolerance   Hematologic/Lymphatic: [] [] bleeding tendency  [] [] enlarged lymph nodes     PMH:   Past Medical History:   Diagnosis Date    Acquired hypothyroidism     patient denies    Asthma     Bilateral cataracts     Chronic kidney disease     Diabetes mellitus     Essential hypertension     H/O drug abuse     Heroin    Kidney stone         Problem List: Principal Problem:    Ischemic stroke ([facility] (2019)    Active Problems:    UTI (urinary tract infection) (2019)      ESRD (end stage renal disease) ([facility] (2019)      OA (osteoarthritis) ()      Diastolic CHF, chronic ([facility] (2019)      PAD (peripheral artery disease) ([facility] (2019)        FH: History reviewed. No pertinent family history. SH:   Social History     Socioeconomic History    Marital status: SINGLE     Spouse name: Not on file    Number of children: Not on file    Years of education: Not on file    Highest education level: Not on file   Tobacco Use    Smoking status: Former Smoker     Last attempt to quit: 1986     Years since quittin.0    Smokeless tobacco: Former User   Substance and Sexual Activity    Alcohol use: No    Drug use: No          Vital Signs:   Visit Vitals  /74 (BP 1 Location: Left arm, BP Patient Position: At rest)   Pulse 78   Temp 97.7 °F (36.5 °C)   Resp 15   Ht 5' 5\" (1.651 m)   Wt 54.4 kg (120 lb)   SpO2 96%   BMI 19.97 kg/m²      Neurological examination:    Appearance: In no acute distress, Cachexic. Cooperative   Cardiovascular: Heart is regular rate and rhythm, peripheral edema is absent. No carotid bruits heard   Mental status examination: Alert and oriented X 1. [place]. Speech slurred and difficult to understand. Normal attention but impaired  memory and fund of knowledge. Follows simple commands with repeated command.  Cranial Nerves:     I: smell Not tested   II: visual fields Visual fields were grossly intact to confrontation. Eye movements were full and conjugate and there was no nystagmus.    II: pupils Equal, round, impaired reactive to light at 4mm   III,VII: ptosis none   III,IV,VI: extraocular muscles  Full ROM   V: facial light touch sensation normal   V,VII: corneal reflex     VII: facial muscle function  normal   VIII: hearing    IX: soft palate elevation  Normal. The palate and tongue moved in the midline. IX,X: gag reflex    XI: sternocleidomastoid strength    XII: tongue strength  Normal.       .   Motor exam: Station, gait:  deferred. Muscle tone, bulk and manual muscle testing: antigravity LUE, antigravity with drift RUE with contracture to hand, BLE tracely antigravity. normal. Spacticity RUE.  Sensory: Grimaces to sharp bilaterally.  Coordination: Unable to test.     Reflexes: Symmetric and 2+ in bilateral biceps, triceps, and brachioradialis. 1+ patellar and 0 ankle reflexes. Plantar reflexes are flexor on left and extensor on right.            Medications:      [x] REVIEWED  Current Facility-Administered Medications   Medication    glucose chewable tablet 16 g    glucagon (GLUCAGEN) injection 1 mg    dextrose (D50W) injection syrg 12.5 g    dextrose (D50) infusion    levETIRAcetam (KEPPRA) tablet 500 mg    hydrALAZINE (APRESOLINE) tablet 50 mg    losartan (COZAAR) tablet 25 mg    metoprolol tartrate (LOPRESSOR) tablet 50 mg    NIFEdipine ER (PROCARDIA XL) tablet 60 mg    pantoprazole (PROTONIX) tablet 40 mg    oxyCODONE-acetaminophen (PERCOCET) 5-325 mg per tablet 1 Tab    prednisoLONE acetate (PRED FORTE) 1 % ophthalmic suspension 1 Drop    sodium bicarbonate tablet 325 mg    0.9% sodium chloride infusion    ondansetron (ZOFRAN) injection 2 mg    aspirin tablet 325 mg    atorvastatin (LIPITOR) tablet 80 mg    acetaminophen (TYLENOL) tablet 650 mg    senna-docusate (PERICOLACE) 8.6-50 mg per tablet 2 Tab    albuterol (PROVENTIL VENTOLIN) nebulizer solution 2.5 mg    folic acid (FOLVITE) 1 mg, thiamine (B-1) 100 mg in 0.9% sodium chloride 50 mL ivpb    cefTRIAXone (ROCEPHIN) 2 g in 0.9% sodium chloride (MBP/ADV) 50 mL MBP    [START ON 1/24/2019] epoetin emerson (EPOGEN;PROCRIT) injection 2,720 Units     Data: [x] REVIEWED  Recent Results (from the past 24 hour(s))   URINALYSIS W/ RFLX MICROSCOPIC    Collection Time: 01/22/19 11:47 AM   Result Value Ref Range    Color YELLOW      Appearance TURBID      Specific gravity 1.012 1.005 - 1.030      pH (UA) 8.5 (H) 5.0 - 8.0      Protein 300 (A) NEG mg/dL    Glucose NEGATIVE  NEG mg/dL    Ketone NEGATIVE  NEG mg/dL    Bilirubin NEGATIVE  NEG      Blood MODERATE (A) NEG      Urobilinogen 0.2 0.2 - 1.0 EU/dL    Nitrites POSITIVE (A) NEG      Leukocyte Esterase LARGE (A) NEG     DRUG SCREEN, URINE    Collection Time: 01/22/19 11:47 AM   Result Value Ref Range    BENZODIAZEPINES NEGATIVE  NEG      BARBITURATES NEGATIVE  NEG      THC (TH-CANNABINOL) NEGATIVE  NEG      OPIATES NEGATIVE  NEG      PCP(PHENCYCLIDINE) NEGATIVE  NEG      COCAINE NEGATIVE  NEG      AMPHETAMINES NEGATIVE  NEG      METHADONE NEGATIVE  NEG      HDSCOM (NOTE)    URINE MICROSCOPIC ONLY    Collection Time: 01/22/19 11:47 AM   Result Value Ref Range    WBC TOO NUMEROUS TO COUNT 0 - 4 /hpf    RBC 4 to 10 0 - 5 /hpf    Epithelial cells FEW 0 - 5 /lpf    Bacteria 4+ (A) NEG /hpf   CULTURE, BLOOD    Collection Time: 01/22/19  6:27 PM   Result Value Ref Range    Special Requests: NO SPECIAL REQUESTS      Culture result: NO GROWTH AFTER 12 HOURS     CULTURE, BLOOD    Collection Time: 01/22/19  6:32 PM   Result Value Ref Range    Special Requests: NO SPECIAL REQUESTS      Culture result: NO GROWTH AFTER 12 HOURS     GLUCOSE, POC    Collection Time: 01/23/19  1:13 AM   Result Value Ref Range    Glucose (POC) 71 70 - 110 mg/dL   GLUCOSE, POC    Collection Time: 01/23/19  3:00 AM   Result Value Ref Range    Glucose (POC) 67 (L) 70 - 110 mg/dL   GLUCOSE, POC    Collection Time: 01/23/19  3:33 AM   Result Value Ref Range    Glucose (POC) 98 70 - 110 mg/dL   CBC WITH AUTOMATED DIFF    Collection Time: 01/23/19  4:30 AM   Result Value Ref Range    WBC 8.8 4.6 - 13.2 K/uL    RBC 3.17 (L) 4.20 - 5.30 M/uL    HGB 10.1 (L) 12.0 - 16.0 g/dL    HCT 28.7 (L) 35.0 - 45.0 %    MCV 90.5 74.0 - 97.0 FL    MCH 31.9 24.0 - 34.0 PG    MCHC 35.2 31.0 - 37.0 g/dL    RDW 14.3 11.6 - 14.5 %    PLATELET 074 985 - 381 K/uL    MPV 10.2 9.2 - 11.8 FL    NEUTROPHILS 50 42 - 75 %    LYMPHOCYTES 32 20 - 51 %    MONOCYTES 16 (H) 2 - 9 %    EOSINOPHILS 1 0 - 5 %    BASOPHILS 1 0 - 3 %    ABS. NEUTROPHILS 4.4 1.8 - 8.0 K/UL    ABS. LYMPHOCYTES 2.8 0.8 - 3.5 K/UL    ABS. MONOCYTES 1.4 (H) 0 - 1.0 K/UL    ABS. EOSINOPHILS 0.1 0.0 - 0.4 K/UL    ABS.  BASOPHILS 0.1 0.0 - 0.1 K/UL    PLATELET COMMENTS ADEQUATE      RBC COMMENTS TARGET CELLS  1+        DF MANUAL     METABOLIC PANEL, BASIC    Collection Time: 01/23/19  4:30 AM   Result Value Ref Range    Sodium 141 136 - 145 mmol/L    Potassium 4.1 3.5 - 5.5 mmol/L    Chloride 104 100 - 108 mmol/L    CO2 34 (H) 21 - 32 mmol/L    Anion gap 3 3.0 - 18 mmol/L    Glucose 70 (L) 74 - 99 mg/dL    BUN 19 (H) 7.0 - 18 MG/DL    Creatinine 4.61 (H) 0.6 - 1.3 MG/DL    BUN/Creatinine ratio 4 (L) 12 - 20      GFR est AA 11 (L) >60 ml/min/1.73m2    GFR est non-AA 9 (L) >60 ml/min/1.73m2    Calcium 9.6 8.5 - 10.1 MG/DL   GLUCOSE, POC    Collection Time: 01/23/19  5:24 AM   Result Value Ref Range    Glucose (POC) 77 70 - 110 mg/dL       Jose Roberto Lopez NP

## 2019-01-23 NOTE — PROGRESS NOTES
Problem: Falls - Risk of 
Goal: *Absence of Falls Document Ольга Gutierrez Fall Risk and appropriate interventions in the flowsheet. Outcome: Progressing Towards Goal 
Fall Risk Interventions: 
  
 
Mentation Interventions: Bed/chair exit alarm Medication Interventions: Bed/chair exit alarm Elimination Interventions: Bed/chair exit alarm Problem: Pressure Injury - Risk of 
Goal: *Prevention of pressure injury Document Mauro Scale and appropriate interventions in the flowsheet. Outcome: Progressing Towards Goal 
Pressure Injury Interventions: 
Sensory Interventions: Assess changes in LOC Moisture Interventions: Absorbent underpads Activity Interventions: Increase time out of bed Mobility Interventions: Assess need for specialty bed Nutrition Interventions: Discuss nutritional consult with provider Friction and Shear Interventions: Lift sheet

## 2019-01-23 NOTE — PROGRESS NOTES
0710: Bedside and Verbal shift change report given to Rizwana Woodard RN (oncoming nurse) by Mouna Blakely (offgoing nurse). Report included the following information SBAR, Kardex and MAR.

## 2019-01-23 NOTE — PROGRESS NOTES
Problem: Falls - Risk of 
Goal: *Absence of Falls Document Dayan Lyn Fall Risk and appropriate interventions in the flowsheet. Outcome: Progressing Towards Goal 
Fall Risk Interventions: 
  
 
Mentation Interventions: Bed/chair exit alarm Medication Interventions: Bed/chair exit alarm Elimination Interventions: Bed/chair exit alarm Problem: Pressure Injury - Risk of 
Goal: *Prevention of pressure injury Document Mauro Scale and appropriate interventions in the flowsheet. Outcome: Progressing Towards Goal 
Pressure Injury Interventions: 
Sensory Interventions: Assess changes in LOC Moisture Interventions: Absorbent underpads Activity Interventions: Increase time out of bed Mobility Interventions: Assess need for specialty bed Nutrition Interventions: Discuss nutritional consult with provider Friction and Shear Interventions: Lift sheet

## 2019-01-23 NOTE — PROGRESS NOTES
Problem: Discharge Planning Goal: *Discharge to safe environment Outcome: Progressing Towards Goal 
LTC

## 2019-01-23 NOTE — PROGRESS NOTES
Problem: Mobility Impaired (Adult and Pediatric) Goal: *Acute Goals and Plan of Care (Insert Text) Physical Therapy Goals Initiated 1/23/2019 and to be accomplished within 7 day(s) 1. Patient will move from supine to sit and sit to supine  in bed with modified independence. 2.  Patient will transfer from bed to chair and chair to bed with supervision/set-up using the least restrictive device. 3.  Patient will perform sit to stand with supervision/set-up. 4.  Patient will ambulate with supervision/set-up for 25 feet with the least restrictive device. Outcome: Progressing Towards Goal 
PHYSICAL THERAPY: Initial Assessment INPATIENT: Medicare: Hospital Day: 2 Patient: Cristobal Harris (15 y.o. female)    Date: 1/23/2019 Primary Diagnosis: Ischemic stroke ([facility] Precautions: Fall PLOF: Unknown ASSESSMENT : 
Patient requires between maximal assistance and minimal assistance/contact guard assist for bed mobility, transfers and ambulation. Oriented to self with max cues. Disoriented to time and place. Poor speech; difficult to understand. Unable to answer home set-up/prior level of function questions. Holds conversation with another person who is not in the room. Min A for supine to sit. No command following to complete manual muscle testing. Max A for sit to stand. Poor standing balance with posterior lean. Unable to advance feet in any direction; unsure if due to command following or mobility deficits. Returned to seated with poor eccentric control. Min A for sit to supine. All needs in reach. Demonstrated use of call bell with simple cues. Patient presents with deficits in: 
Bed Mobility, Transfers, Gait, Strength, Range of Motion, Balance and Stairs Patient will benefit from skilled intervention to address the above impairments. Patients rehabilitation potential is considered to be Fair Factors which may influence rehabilitation potential include:  
[]         None noted []         Mental ability/status [x]         Medical condition 
[x]         Home/family situation and support systems 
[x]         Safety awareness 
[]         Pain tolerance/management 
[]         Other: PLAN : 
Recommendations and Planned Interventions: 
[x]           Bed Mobility Training             [x]    Neuromuscular Re-Education 
[x]           Transfer Training                   []    Orthotic/Prosthetic Training 
[x]           Gait Training                          []    Modalities [x]           Therapeutic Exercises          []    Edema Management/Control 
[x]           Therapeutic Activities            [x]    Patient and Family Training/Education 
[]           Other (comment): EDUCATION:  
Education:  Patient was educated on the following topics: Bed mobility, transfers, ADLs, balance, amb, safety, exercise, role of PT, plan of care, cognition, skin integrity, vitals Barriers to Learning/Limitations: yes;  cognitive, sensory deficits-vision/hearing/speech and altered mental status (i.e.Sedation, Confusion) Compensate with: visual, verbal, tactile, kinesthetic cues/model Frequency/Duration: Patient will be followed by physical therapy 3-5 times a week to address goals. Discharge Recommendations: Lawrence Valdovinos Further Equipment Recommendations for Discharge: TBD with amb SUBJECTIVE:  
Patient stated She wasn't talking to you.  OBJECTIVE DATA SUMMARY:  
 
Past Medical History:  
Diagnosis Date  Acquired hypothyroidism   
 patient denies  Asthma  Bilateral cataracts  Chronic kidney disease  Diabetes mellitus  Essential hypertension  H/O drug abuse Heroin  Kidney stone Past Surgical History:  
Procedure Laterality Date  HX COLONOSCOPY    
 HX ENDOSCOPY  6/10/2010  HX HYSTERECTOMY  HX LITHOTRIPSY  HX OTHER SURGICAL Cochlear implant  HX OTHER SURGICAL  6/14/2011 Bone marrow biopsy Eval Complexity: History: MEDIUM  Complexity : 1-2 comorbidities / personal factors will impact the outcome/ POC Exam:MEDIUM Complexity : 3 Standardized tests and measures addressing body structure, function, activity limitation and / or participation in recreation  Presentation: MEDIUM Complexity : Evolving with changing characteristics  Clinical Decision Making:Medium Complexity clinical judgement; ROM, MMT, functional mobility Overall Complexity:MEDIUM Prior Level of Function/Home Situation: Unknown Critical Behavior: 
Neurologic State: Alert Orientation Level: Oriented to person;Disoriented to time;Disoriented to situation;Disoriented to place Cognition: Decreased command following;Decreased attention/concentration Psychosocial 
Patient Behaviors: Talkative Manual Muscle Testing (LE) Unable to test 
Tone : BLE normalSensation: unable to assess Range Of Motion: BLE AROM decreased functional 
Functional Mobility: 
 
 
Functional Status Indep (I) Mod I Super-vision Min A Mod A Max A Total A Assist x2 Verbal cues Additional time Not tested Comments Rolling []  []  [] []    []    []  []  [] [] [] [x] Supine to sit []  []  [] [x]  []  []  []  [] [] [] [] Sit to supine []  []  [] [x]  []  []  []  [] [] [] [] Sit to stand []  []  [] []  []  [x]  []  [] [] [] []   
Stand to sit []  []  [] []  []  [x]  []  [] [] [] [] Bed to chair transfers []  []  [] []  []  []  []  [] [] [] [x] Balance Good Willadean Jose Cruz Poor Unable Not tested Comments Sitting static []  [x]  []  []  [] Sitting dynamic []  [x]  []  []  []   
Standing static []  []  [x]  []  []   
Standing dynamic []  []  []  [x]  [] Pain: 
Pre treatment pain level: 0 Post treatment pain level: 0 Pain Scale 1: Numeric (0 - 10) Pain Intensity 1: 0 Activity Tolerance:  
Fair Please refer to the flowsheet for vital signs taken during this treatment. After treatment: []         Patient left in no apparent distress sitting up in chair 
[x]         Patient left in no apparent distress in bed 
[x]         Call bell left within reach [x]         Nursing notified 
[]         Caregiver present 
[]         Bed alarm activated COMMUNICATION/EDUCATION:  
[x]         Fall prevention education was provided and the patient/caregiver indicated understanding. [x]         Patient/family have participated as able in goal setting and plan of care. [x]         Patient/family agree to work toward stated goals and plan of care. []         Patient understands intent and goals of therapy, but is neutral about his/her participation. []         Patient is unable to participate in goal setting and plan of care. Thank you for this referral. 
Aminata Coto, PT Time Calculation: 11 mins

## 2019-01-23 NOTE — PROGRESS NOTES
Problem: Self Care Deficits Care Plan (Adult) Goal: *Acute Goals and Plan of Care (Insert Text) Occupational Therapy Goals Initiated 1/23/2019 within 7 day(s). 1.  Patient will perform grooming tasks at EOB with minimal assistance. 2.  Patient will perform lower body dressing with moderate assistance. 3.  Patient will perform functional task in standing for 8 minutes with minimal assistance for balance and < 3 rest breaks in prep for ADLs. 4.  Patient will perform toilet transfers with moderate assistance. 5.  Patient will perform all aspects of toileting with moderate assistance. 6.  Patient will participate in upper extremity therapeutic exercise/activities for 8 minutes with supervision/set-up to increase BUE strength for functional transfers and ADLs. 7.  Patient will utilize energy conservation techniques during functional activities with moderate verbal cues. Outcome: [address] Occupational THERAPY: Initial Assessment INPATIENT: Medicare: Hospital Day: 2 Patient: Ankur Costello (78 y.o. female)    Date: 1/23/2019 Primary Diagnosis: Ischemic stroke (HCC)  
,  ,  
Precautions: Falls ; [place] 
PLOF: Unknown; pt [place] and poor historian ASSESSMENT:  
Based on the objective data described below, the patient presents with impairments with regard to bed mobility, activity tolerance, and independence in aDLs. Pt supine on arrival, extremely [place], follows commands with increased time. Alert to self and place. Re-oriented to situation multiple times t/o session. Min A for supine-->sit; fair- sitting balance. Decreased BUE AROM/strength. 2 sit to stand transfers in prep for [facility] transfer (for ADLs) and toilet transfer with Max A. Poor standing balance with decreased safety awareness. Pt attempting to transfer to [facility]; max cueing to return to EOB for safety. Pt [place] which impacts ability to safely follow instructions in standing.  Pt left supine with HOB elevated and needs within reach. Recommend SNF upon d/c. Recommendations for the next treatment session: ADLs at EOB Ms. Maximiliano Soto will benefit from skilled intervention to address the above impairments. Her rehabilitation potential is considered to be Fair. EDUCATION Education:  Patient was educated on the following topics: role of  OT and POC; needs reinforcement Barriers to Learning/Limitations: yes;  cognitive and sensory deficits-vision/hearing/speech Compensate with: visual, verbal, tactile, kinesthetic cues/model PLAN OF CARE:  
Problems:  Decreased ROM, Decreased strength affecting function, Decreased ADL/functioning of activities, Decreased transfer abilities and Decreased activity tolerance Recommendations and Planned Interventions: 
[x]                  Self Care Training                   [x]         Therapeutic Activities [x]                  Functional Mobility Training    []          Cognitive Retraining 
[x]                  Therapeutic Exercises            [x]          Endurance Activities [x]                  Balance Training                     []          Neuromuscular Re-ed []                  Visual/Perceptual Training      [x]       Home Safety Training 
[x]                  Patient Education                    [x]          Family Training/Education []                  Other (comment): Frequency/Duration: Patient will be followed by occupational therapy 3-5times a week to address goals. Discharge Recommendations: Lawrence Valdovinos Further Equipment Recommendations for Discharge: bedside commode SUBJECTIVE:  
Patient stated: \"Where is my bra? \" OBJECTIVE/TREATMENT:  
 
Past Medical History:  
Diagnosis Date  Acquired hypothyroidism   
 patient denies  Asthma  Bilateral cataracts  Chronic kidney disease  Diabetes mellitus  Essential hypertension  H/O drug abuse Heroin  Kidney stone Past Surgical History:  
Procedure Laterality Date  HX COLONOSCOPY    
 HX ENDOSCOPY  6/10/2010  HX HYSTERECTOMY  HX LITHOTRIPSY  HX OTHER SURGICAL Cochlear implant  HX OTHER SURGICAL  6/14/2011 Bone marrow biopsy Eval Complexty: History: MEDIUM Complexity : Expanded review of history including physical, cognitive and psychosocial  history ; Examination: MEDIUM Complexity : 3-5 performance deficits relating to physical, cognitive , or psychosocial skils that result in activity limitations and / or participation restrictions; Decision Making:MEDIUM Complexity : Patient may present with comorbidities that affect occupational performnce. Miniml to moderate modification of tasks or assistance (eg, physical or verbal ) with assesment(s) is necessary to enable patient to complete evaluation Prior Level of Function/Home Situation: Unknown, pt [place] and poor historian 
 
Cognitive/Behavioral Status:  
Neurologic State: alert Orientation: only aware of  place and person Cognition:  following commands  follows one step commands/direction and follows two step commands/direction Safety/Judgement: Decreased awareness of environment and Decreased awareness of need for assistance ROM: minimally limited (BUEs) MMT: 3/5 (BUEs) Coordination: Generally decreased (BUEs) Hand dominance:Right Skin: Intact (BUEs) Edema: None noted (BUEs) Sensation: Intact (BUEs) Vision/Perceptual: normal 
 
 
Functional Status Indep Mod I  
Sup. / 
Set- Up SBA CGA Min Assist  
Mod Assist  
Max assist  
Total Assist  
Assist x2 Additional Time NT Comments Rolling []  []  []  []  []    [x]    []    []  []  []  []  [] Supine to sit []  []  []  []  []  [x]  []  []  []  []  []  [] Sit to supine []  []  []  [x]  []  []  []  []  []  []  [x]  [] Sit to stand []  []  []  []  []  []  []  [x]  []  []  []  [] Toilet Transfer []  []  []  []  []  []  []  [x]  []  []  []  [] Feeding []  []  []  []  []  [x]  []  []  []  []  []  []    
Grooming []  []  []  []  []  [x]  []  []  []  []  []  [] Bathing  []  []  []  []  []  []  []  [x]  []  []  []  []    
UB Dressing  []  []  []  []  []  [x]  []  []  []  []  []  []    
LB Dressing  []  []  []  []  []  []  []  [x]  []  []  []  [] Toileting []  []  []  []  []  []  []  [x]  []  []  []  [] Balance Good Willadean Jose Cruz Poor Unable Comments Sitting static []  [x]  []  [] Sitting dynamic []  []  [x]  [] Standing static []  []  [x]  [] Standing dynamic []  []  []  [x] Therapeutic Activity:  
2 sit to stand transfers in prep for [facility] transfer (for ADLs) and toilet transfer with Max A; poor standing balance with decreased safety awareness. Pt [place] which impacts ability to safely follow instructions in standing. Pain:  
Pre treatment: 0/10 Post treatment: 0/10 Scale: numeric Activity tolerance:  fair COMMUNICATION/EDUCATION: Pt educated on role of OT and POC;   
[x]         Fall prevention education was provided and the patient/caregiver indicated understanding. [x]         Patient/family have participated as able in goal setting and plan of care. [x]         Patient/family agree to work toward stated goals and plan of care. []         Patient understands intent and goals of therapy, but is neutral about his/her participation The patients plan of care was also discussed with: Physical Therapist, Certified Occupational Therapy Assistant and Registered Nurse. · After treatment position/precautions:  
o Supine in bed 
o Call light within reach 
o RN notified Recommendations for nursing: up with assist x1-2 Thank you for this referral. 
Rusty Haro MS OTR/L Time Calculation: 16 mins

## 2019-01-23 NOTE — CDMP QUERY
Encephalopathy documented by neurology : Encephalopathy secondary UTI and metabolic degrangements If you concur with this dx please further specify type of Encephalopathy in the medical record as : 
 
Metabolic Encephalopathy Other Encephalopathy No encephalopathy present Other, please specify Clinically unable to determine The medical record reflects the following: 
   Risk Factors: ESRD, HTN, DM Clinical Indicators: 1/23- Neuro cons-it seems she is at baseline or apparently not far off. Her UTI may be contributing to some encephalopathy. 1/23  EEG. The diffuse background slowing is consistent with encephalopathy. Treatment: IV abx, HD, neuro consult Thanks, [address] [facility], [address]

## 2019-01-23 NOTE — PROGRESS NOTES
Bedside and Verbal shift change report given to Glendy Quintanilla, RN (oncoming nurse) by Griselda Merle, RN (offgoing nurse). Report included the following information SBAR, Kardex, Intake/Output, MAR and Cardiac Rhythm NSR. Pt alert and oriented x 3-4. No significant changes when dual NIH completed, vitals WNL, denies pain, bed alarm on, bed locked and low, call bell in reach. Will continue to monitor. 9236- Emergency contact called for MRI completion form 46Juanejuana Lopez paged-Pt admitted possible ischemic stroke FYI- MRI unable to be completed due to cochlear implant Mild improvement from baseline noted. Continue Q2 NIH or Q4? Glendy Quintanilla- [phone] 
 
2067- Green- Ad: possible ischemic stroke Pt is NPO, she has multiple BP meds due. Meds included are Nifedipine, hydralazine, metoprolol, losartan. Let me know if u want to transition anything to IV Last systolic w/in goal at 715 Glendy Quintanilla - [phone] 
 
9122- Speech progressed diet. Will give rest of BP medications. Clarified with NP Tom, Stated goal  
  
1200- No changes from baseline. Pt transported to CT. MRI unable to be completed due to cochlear implant. MD aware. EEG ordered for suspected seizure activity. NP communicated to this RN that she may not be receiving her home dose keppra.   
Bedside Report Given to Maty Bunn, [address] [facility]- CT contacted to transport pt directly from CT to EEG tech per EEG tech request

## 2019-01-23 NOTE — PROGRESS NOTES
Reason for Admission:   Ischemic stroke RRAT Score:   22 Resources/supports as identified by patient/family:  Has family - daughter and daughter's aunt, Kyleigh Graff, who is the emergency contact for family Top Challenges facing patient (as identified by patient/family and CM): Finances/Medication cost?  Pt has Medicare and Medicaid for insurance Transportation? Will need stretcher transport, pt is non-ambulatory Support system or lack thereof? As above Living arrangements? Was independent at home until December when she fell. Was hospitalized at [facility] and then sent to [facility] for SNF. It was determined while there that she is unable to stand and required 24/7 care so she was to be transitioned to LTC at [facility] beginning 1/24/19. Self-care/ADLs/Cognition? Pt confused, unable to do self-care Current Advanced Directive/Advance Care Plan:  Not on file Plan for utilizing home health:    n/a Likelihood of readmission: high/red Transition of Care Plan:   
 
Pt unable to answer questions - CM spoke with Kyleigh Graff, who is the pt's emergency contact. She reported above information, stating that family desires for pt to return to [facility] for LTC at discharge. Posted to [facility] in [place] Care Management Interventions PCP Verified by CM: Yes Last Visit to PCP: 12/23/18 Mode of Transport at Discharge: BLS Transition of Care Consult (CM Consult): Discharge Planning Physical Therapy Consult: Yes Occupational Therapy Consult: Yes Speech Therapy Consult: Yes Current Support Network: Nursing Facility(pt has been resident at [facility] since Dec 27) Plan discussed with Pt/Family/Caregiver: Yes Discharge Location Discharge Placement: Long Term Care(Pt had been snf at [facility] since 12/27/18. Was to become LTC on 1/24/19)

## 2019-01-24 ENCOUNTER — APPOINTMENT (OUTPATIENT)
Dept: NON INVASIVE DIAGNOSTICS | Age: 79
DRG: 689 | End: 2019-01-24
Attending: HOSPITALIST
Payer: MEDICARE

## 2019-01-24 VITALS
TEMPERATURE: 97.7 F | HEART RATE: 73 BPM | HEIGHT: 65 IN | RESPIRATION RATE: 16 BRPM | WEIGHT: 120 LBS | SYSTOLIC BLOOD PRESSURE: 133 MMHG | DIASTOLIC BLOOD PRESSURE: 80 MMHG | BODY MASS INDEX: 19.99 KG/M2 | OXYGEN SATURATION: 94 %

## 2019-01-24 PROBLEM — G93.40 ACUTE ENCEPHALOPATHY: Status: ACTIVE | Noted: 2019-01-24

## 2019-01-24 LAB
BACTERIA SPEC CULT: NORMAL
ECHO AO ROOT DIAM: 2.6 CM
ECHO AV PEAK GRADIENT: 0 MMHG
ECHO AV PEAK VELOCITY: 0 CM/S
ECHO EST RA PRESSURE: 10 MMHG
ECHO IVC SNIFF: 2.05 CM
ECHO LA VOL 2C: 86.19 ML (ref 22–52)
ECHO LA VOL 4C: 74.1 ML (ref 22–52)
ECHO LA VOLUME INDEX A2C: 54.14 ML/M2 (ref 16–28)
ECHO LA VOLUME INDEX A4C: 46.54 ML/M2 (ref 16–28)
ECHO LV EDV A2C: 95.3 ML
ECHO LV EDV A4C: 90.8 ML
ECHO LV EDV BP: 93.3 ML (ref 56–104)
ECHO LV EDV INDEX A4C: 57 ML/M2
ECHO LV EDV INDEX BP: 58.6 ML/M2
ECHO LV EDV NDEX A2C: 59.9 ML/M2
ECHO LV EJECTION FRACTION A2C: 58 %
ECHO LV EJECTION FRACTION A4C: 56 %
ECHO LV EJECTION FRACTION BIPLANE: 55.9 % (ref 55–100)
ECHO LV ESV A2C: 39.9 ML
ECHO LV ESV A4C: 39.6 ML
ECHO LV ESV BP: 41.2 ML (ref 19–49)
ECHO LV ESV INDEX A2C: 25.1 ML/M2
ECHO LV ESV INDEX A4C: 24.9 ML/M2
ECHO LV ESV INDEX BP: 25.9 ML/M2
ECHO LV INTERNAL DIMENSION DIASTOLIC: 5.12 CM (ref 3.9–5.3)
ECHO LV INTERNAL DIMENSION SYSTOLIC: 3.71 CM
ECHO LV IVSD: 1.04 CM (ref 0.6–0.9)
ECHO LV MASS 2D: 257.6 G (ref 67–162)
ECHO LV MASS INDEX 2D: 161.8 G/M2 (ref 43–95)
ECHO LV POSTERIOR WALL DIASTOLIC: 1.17 CM (ref 0.6–0.9)
ECHO LVOT DIAM: 1.86 CM
ECHO LVOT PEAK GRADIENT: 3.4 MMHG
ECHO LVOT PEAK VELOCITY: 91.72 CM/S
ECHO MV A VELOCITY: 122.02 CM/S
ECHO MV AREA PHT: 5 CM2
ECHO MV E DECELERATION TIME (DT): 153.2 MS
ECHO MV E VELOCITY: 0.79 CM/S
ECHO MV E/A RATIO: 0.01
ECHO MV PRESSURE HALF TIME (PHT): 44.4 MS
ECHO PULMONARY ARTERY SYSTOLIC PRESSURE (PASP): 43 MMHG
ECHO RIGHT VENTRICULAR SYSTOLIC PRESSURE (RVSP): 17.5 MMHG
ECHO TV REGURGITANT MAX VELOCITY: -137.26 CM/S
ECHO TV REGURGITANT PEAK GRADIENT: 7.5 MMHG
GLUCOSE BLD STRIP.AUTO-MCNC: 105 MG/DL (ref 70–110)
GLUCOSE BLD STRIP.AUTO-MCNC: 83 MG/DL (ref 70–110)
GLUCOSE BLD STRIP.AUTO-MCNC: 84 MG/DL (ref 70–110)
SERVICE CMNT-IMP: NORMAL

## 2019-01-24 PROCEDURE — 77030032490 HC SLV COMPR SCD KNE COVD -B

## 2019-01-24 PROCEDURE — 74011250637 HC RX REV CODE- 250/637: Performed by: NURSE PRACTITIONER

## 2019-01-24 PROCEDURE — 90935 HEMODIALYSIS ONE EVALUATION: CPT

## 2019-01-24 PROCEDURE — 82962 GLUCOSE BLOOD TEST: CPT

## 2019-01-24 PROCEDURE — 93306 TTE W/DOPPLER COMPLETE: CPT

## 2019-01-24 RX ORDER — LEVOFLOXACIN 250 MG/1
250 TABLET ORAL EVERY OTHER DAY
Qty: 4 TAB | Refills: 0 | Status: SHIPPED | OUTPATIENT
Start: 2019-01-24

## 2019-01-24 RX ADMIN — LEVETIRACETAM 500 MG: 500 TABLET, FILM COATED ORAL at 14:31

## 2019-01-24 NOTE — PROGRESS NOTES
Assume care of patient lying in bed awake. Very [place] and unable to assess orientation status. Bed locked in lowest position. Instructed on Call light and patient unable to used due to bilateral hard of hearing. Dual NIH complete with Ahsan Hankins RN. Communication by writing questions and demonstration. 2200 Patient agitated and attempting to get out of bed. Bed alarm in place. Refusing eye drops at present with oral medication . After several attempt decide to take oral medication. 2300 Patient had taken gown, cardiac monitoring and attempted IV site. Redirected and reoriented without success. Continue to be stubborn and sliding out of bed after several requesting to return back in bed.       
 
 
01/24/2019 
 
0200 Patient refusing to go back to bed with resistance when attempting to place her back in bed. Cooperative at intervals with some of care. Able to place in wheelchair at nurses station. 0430 Patient continue to be disruptive trying to get out of wheelchair at frequent intervals. 0715 Bedside and Verbal shift change report given to Ahsan Hankins RN (oncoming nurse) by Norma Baumgarten, RN (offgoing nurse). Report given with SBAR, Kardex, Intake/Output, MAR and Recent Results. Rosanne Echeverria

## 2019-01-24 NOTE — PROGRESS NOTES
Attempted PT X2; first attempt pt getting test, second attempt pt off the floor for dialysis.    Jae Lagos, PTA

## 2019-01-24 NOTE — PROGRESS NOTES
Problem: Falls - Risk of 
Goal: *Absence of Falls Document Raji Macleod Fall Risk and appropriate interventions in the flowsheet. Outcome: Progressing Towards Goal 
Fall Risk Interventions: 
  
 
Mentation Interventions: (P) Adequate sleep, hydration, pain control Medication Interventions: Bed/chair exit alarm Elimination Interventions: (P) (oliguria) Problem: Pressure Injury - Risk of 
Goal: *Prevention of pressure injury Document Mauro Scale and appropriate interventions in the flowsheet. Outcome: Progressing Towards Goal 
Pressure Injury Interventions: 
Sensory Interventions: (P) Assess changes in LOC, Keep linens dry and wrinkle-free, Check visual cues for pain, Monitor skin under medical devices Moisture Interventions: (P) Apply protective barrier, creams and emollients, Absorbent underpads Activity Interventions: (P) Increase time out of bed Mobility Interventions: (P) HOB 30 degrees or less Nutrition Interventions: (P) Document food/fluid/supplement intake Friction and Shear Interventions: (P) Foam dressings/transparent film/skin sealants, HOB 30 degrees or less, Apply protective barrier, creams and emollients

## 2019-01-24 NOTE — DIALYSIS
ACUTE HEMODIALYSIS FLOW SHEET 
 
HEMODIALYSIS ORDERS: Physician: PENNY Souza Dialyzer: aclear   Duration: 3 hr  BFR: 400   DFR: 800 Dialysate:  Temp 37 K+   3    Ca+  2.5 Na 138 Bicarb 30 Weight:  54.4 kg    Patient Chart [x]     Unable to Obtain []   Dry weight/UF Goal: 2000 Access AVG Needle Gauge 16 Heparin []  Bolus      Units    [] Hourly       Units    [x]None Catheter locking solution n/a  
Pre BP:   146/66    Pulse:     69      Respirations: 18 Temperature:   97.4  Tx: NS       ml/Bolus  Other        [x] N/A Labs: Pre        Post:        [x] N/A Additional Orders(medications, blood products, hypotension management):       [x] N/A [x] Tylerita Consent Verified CATHETER ACCESS: [x]N/A   []Right   []Left   []IJ     []Fem   []Chest wall  
[] First use X-ray verified     []Tunnel                [] Non Tunneled []No S/S infection  []Redness  []Drainage []Cultured []Swelling []Pain []Medical Aseptic Prep Utilized   []Dressing Changed  [] Biopatch  Date:      
[]Clotted   []Patent   Flows: []Good  []Poor  []Reversed If access problem,  notified: []Yes    []N/A  Date:        
 
GRAFT/FISTULA ACCESS:  []N/A     [x]Right     []Left     []UE     [x]LE [x]AVG   []AVF        []Buttonhole    [x]Medical Aseptic Prep Utilized [x]No S/S infection  []Redness  []Drainage []Cultured []Swelling []Pain Bruit:   [x] Strong    [] Weak       Thrill :   [x] Strong    [] Weak Needle Gauge: 16   Length: 1 If access problem,  notified: []Yes     [x]N/A  Date:       
Please describe access if present and not used:n/a  
 
 
GENERAL ASSESSMENT:   
LUNGS:  Rate 18 SaO2%        [] N/A    [] Clear  [x] Coarse  [] Crackles  [] Wheezing 
      [] Diminished     Location : []RLL   []LLL    []RUL  []SERGIO Cough: []Productive  [x]Dry  []N/A   Respirations:  [x]Easy  []Labored Therapy:  [x]RA  []NC  l/min    Mask: []NRB []Venti       O2% []Ventilator  []Intubated  [] Trach  [] BiPaP CARDIAC: [x]Regular      [] Irregular   [] Pericardial Rub  [] JVD []  Monitored  [] Bedside  [x] Remotely monitored [] N/A  Rhythm: NSR  
EDEMA: [] None  [x]Generalized  [] Pitting [] 1    [] 2    [] 3    [] 4 [] Facial  [] Pedal  []  UE  [] LE  
SKIN:   [x] Warm  [] Hot     [] Cold   [x] Dry     [] Pale   [] Diaphoretic    
             [] Flushed  [] Jaundiced  [] Cyanotic  [] Rash  [] Weeping LOC:    [] Alert      []Oriented:    [] Person     [] Place  []Time 
             [x] Confused  [] Lethargic  [] Medicated  [] Non-responsive GI / ABDOMEN:  [] Flat    [] Distended    [x] Soft    [] Firm   []  Obese 
                           [] Diarrhea  [x] Bowel Sounds  [] Nausea  [] Vomiting  / URINE ASSESSMENT:[] Voiding   [x] Oliguria  [] Anuria   []  [place]hillon [x] Incontinent    []  Incontinent Brief      []  Bathroom Privileges PAIN: [x] 0 []1  []2   []3   []4   []5   []6   []7   []8   []9   []10 Scale 0-10  Action/Follow Up: N/A MOBILITY:  [] Amb    [] Amb/Assist    [x] Bed    [] Wheelchair  [] Stretcher All Vitals and Treatment Details on Attached Flowsheet Hospital: [facility] Room # 2102 [] 1st Time Acute  [] Stat  [x] Routine  [] Urgent [x] Acute Room  []  Bedside  [] ICU/CCU  [] ER Isolation Precautions:  [x] Dialysis   [] Airborne   [] Contact    [] Reverse Special Considerations:         [] Blood Consent Verified [x]N/A ALLERGIES:   [] NKA     Sulfa Code Status:  [x] Full Code  [] DNR  [] Other HBsAg ONLY: Date Drawn 01/22/19         [x]Negative []Positive []Unknown  2nd RN check :  complete HBsAb: Date 01/22/19    [] Susceptible   [x] [id number] []Not Drawn  [] Drawn Current Labs:    Date of Labs:  01/23/2019         Today [] Cut and paste current labs here.     Results for Humberto Armendariz (MRN [id number]) as of 1/24/2019 11:37 
 Ref. Range 1/23/2019 04:30 Sodium Latest Ref Range: 136 - 145 mmol/L 141 Potassium Latest Ref Range: 3.5 - 5.5 mmol/L 4.1 Chloride Latest Ref Range: 100 - 108 mmol/L 104 CO2 Latest Ref Range: 21 - 32 mmol/L 34 (H) Anion gap Latest Ref Range: 3.0 - 18 mmol/L 3 Glucose Latest Ref Range: 74 - 99 mg/dL 70 (L) BUN Latest Ref Range: 7.0 - 18 MG/DL 19 (H) Creatinine Latest Ref Range: 0.6 - 1.3 MG/DL 4.61 (H) BUN/Creatinine ratio Latest Ref Range: 12 - 20   4 (L) Calcium Latest Ref Range: 8.5 - 10.1 MG/DL 9.6 GFR est non-AA Latest Ref Range: >60 ml/min/1.73m2 9 (L) GFR est AA Latest Ref Range: >60 ml/min/1.73m2 11 (L) DIET:  [x] Renal    [] Other     [] NPO     []  Diabetic PRIMARY NURSE REPORT: First initial/Last name/Title Pre Dialysis: Wolf Hodge RN    Time: 1969 EDUCATION:   
[x] Patient [] Other         Knowledge Basis: []None [x]Minimal [] Substantial  
Barriers to learning  []N/A  Pt has an altered mental status at this time. [] Access Care     [x] S&S of infection     [] Fluid Management     []K+     []Procedural   
[]Albumin     [] Medications     [] Tx Options     [] Transplant     [] Diet     [] Other Teaching Tools:  [x] Explain  [] Demo  [] Handouts [] Video Patient response:   [] Verbalized understanding  [] Teach back  [] Return demonstration [x] Requires follow up Inappropriate due to         
 
[x] Time Out/Safety Check  [x]Extracorporeal Circuit Tested for integrity RO/HEMODIALYSIS MACHINE SAFETY CHECKS  Before each treatment:    
Machine Number:                   
                                                 [facility] 
                                [] Portable Machine #11/RO serial # [id number] [x] Portable Machine #12/RO serial # [id number] [] Portable Machine #13/RO serial #  [id number] Alarm Test:  Pass time 0920         Other:        
[x] RO/Machine Log Complete Temp    37 Dialysate: pH  7.4 Conductivity: Meter   14     HD Machine   14.2                  TCD: 14 
Dialyzer Lot # [id number]            Blood Tubing Lot # [id number]          Saline Lot #  [id number]  
 
CHLORINE TESTING-Before each treatment and every 4 hours Total Chlorine: [x] less than 0.1 ppm  Time: 0900 4 Hr/2nd Check Time:  n/a  
(if greater than 0.1 ppm from Primary then every 30 minutes from Secondary) TREATMENT INITIATION  with Dialysis Precautions:  
[x] All Connections Secured                 [x] Saline Line Double Clamped  
[x] Venous Parameters Set                  [x] Arterial Parameters Set [x] Prime Given 250mL                         [x]Air Foam Detector Engaged Treatment Initiation Note:Received pt to the treatment area awake and alert. Access site is clean, dry and intact with no s/s of infection. Pt cannulated by Jose Mendoza RN under my supervision. Treatment started as ordered. During Treatment Notes: 
 
1030: Pt noted to have high venous pressures. Pressures were high enough to stop the blood pump. Small area of infiltration noted to venous needle site. Venous site recannulated by ISAMAR Holbrook RN. Treatment resumed. Medication Dose Volume Route Initials Dialyzer Cleared: [x] Good [] Fair  [] Poor Blood processed:  65.8 L 
UF Removed  2000mL POst BP:   135/58       Pulse: 70      Respirations: 18  Temperature: 97.8 Post Tx Vascular Access: AVF/AVG: Bleeding stopped Art 10 min. Dev. 5 Min Catheter: Locking solution: Heparin 1:1000 Art. Dev. N/A Post Assessment:  
  
                              Skin:  [x] Warm  [x] Dry [] Diaphoretic    [] Flushed  [] Pale [] Cyanotic [facility] Title Initials  Time Lungs: [x] Clear    [] Course  [] Crackles  [] Wheezing [] Diminished Jerolyn Heaps RN LG 1400 Cardiac: [x] Regular   [] Irregular   [] Monitor  [] N/A  Rhythm:      
    Edema:  [x] None    [] General     [] Facial   [] Pedal    [] UE    [] LE  
    Pain: [x]0  []1  []2   []3  []4   []5   []6   []7   []8   []9   []10 Post Treatment Note: 
 
 Pt tolerated treatment with no hemodialysis-related complications. Pt has no current c/o distress. Pt returned to room by transport team.  
  
 
POST TREATMENT PRIMARY NURSE HANDOFF REPORT:  
 
First initial/Last name/Title Post Dialysis: Rollie Goltz, RN Time:  8124 Abbreviations: AVG-arterial venous graft, AVF-arterial venous fistula, IJ-Internal Jugular, Subcl-Subclavian, Fem-Femoral, Tx-treatment, AP/HR-apical heart rate, DFR-dialysate flow rate, BFR-blood flow rate, AP-arterial pressure, -venous pressure, UF-ultrafiltrate, TMP-transmembrane pressure, Dev-Venous, Art-Arterial, RO-Reverse Osmosis

## 2019-01-24 NOTE — PROGRESS NOTES
Speech Pathology Note 
 
0900: Could not progress with ST tx because patient was unable to be aroused for safe PO intake. 1030: Pt off the unit. Speech Therapist will re-attempt tx next day. Shraddha Valdivia M.S. CF-SLP Speech Language Pathologist

## 2019-01-24 NOTE — PROGRESS NOTES
Pt cleared to transfer back to [facility] @ [facility], she has been accepted to return. [facility] medical transport set up for [facility] with Ms. Hodgson, caregiver & made her aware of above. Pt's nurse made aware of above. Available as needed. Joanne Miranda,RN,ext [phone]. Care Management Interventions PCP Verified by CM: Yes Last Visit to PCP: 12/23/18 Mode of Transport at Discharge: S Transition of Care Consult (CM Consult): Discharge Planning Physical Therapy Consult: Yes Occupational Therapy Consult: Yes Speech Therapy Consult: Yes Current Support Network: Nursing Facility(pt has been resident at [facility] since Dec 27) Plan discussed with Pt/Family/Caregiver: Yes Discharge Location Discharge Placement: Long Term Care(Pt had been snf at [facility] since 12/27/18. Was to become LTC on 1/24/19)

## 2019-01-24 NOTE — DISCHARGE INSTRUCTIONS
Patient Education        Urinary Tract Infection in Women: Care Instructions  Your Care Instructions    A urinary tract infection, or UTI, is a general term for an infection anywhere between the kidneys and the urethra (where urine comes out). Most UTIs are bladder infections. They often cause pain or burning when you urinate. UTIs are caused by bacteria and can be cured with antibiotics. Be sure to complete your treatment so that the infection goes away. Follow-up care is a key part of your treatment and safety. Be sure to make and go to all appointments, and call your doctor if you are having problems. It's also a good idea to know your test results and keep a list of the medicines you take. How can you care for yourself at home? · Take your antibiotics as directed. Do not stop taking them just because you feel better. You need to take the full course of antibiotics. · Drink extra water and other fluids for the next day or two. This may help wash out the bacteria that are causing the infection. (If you have kidney, heart, or liver disease and have to limit fluids, talk with your doctor before you increase your fluid intake.)  · Avoid drinks that are carbonated or have caffeine. They can irritate the bladder. · Urinate often. Try to empty your bladder each time. · To relieve pain, take a hot bath or lay a heating pad set on low over your lower belly or genital area. Never go to sleep with a heating pad in place. To prevent UTIs  · Drink plenty of water each day. This helps you urinate often, which clears bacteria from your system. (If you have kidney, heart, or liver disease and have to limit fluids, talk with your doctor before you increase your fluid intake.)  · Urinate when you need to. · Urinate right after you have sex. · Change sanitary pads often. · Avoid douches, bubble baths, feminine hygiene sprays, and other feminine hygiene products that have deodorants.   · After going to the bathroom, wipe from front to back. When should you call for help? Call your doctor now or seek immediate medical care if:    · Symptoms such as fever, chills, nausea, or vomiting get worse or appear for the first time.     · You have new pain in your back just below your rib cage. This is called flank pain.     · There is new blood or pus in your urine.     · You have any problems with your antibiotic medicine.    Watch closely for changes in your health, and be sure to contact your doctor if:    · You are not getting better after taking an antibiotic for 2 days.     · Your symptoms go away but then come back. Where can you learn more? Go to http://dalia-aaliyah.info/. Enter P017 in the search box to learn more about \"Urinary Tract Infection in Women: Care Instructions. \"  Current as of: March 20, 2018  Content Version: 11.9  © 8938-6923 [email or web address]. Care instructions adapted under license by [employer] (which disclaims liability or warranty for this information). If you have questions about a medical condition or this instruction, always ask your healthcare professional. [address] any warranty or liability for your use of this information. DISCHARGE SUMMARY from Nurse    PATIENT INSTRUCTIONS:    After general anesthesia or intravenous sedation, for 24 hours or while taking prescription Narcotics:  · Limit your activities  · Do not drive and operate hazardous machinery  · Do not make important personal or business decisions  · Do  not drink alcoholic beverages  · If you have not urinated within 8 hours after discharge, please contact your surgeon on call.     Report the following to your surgeon:  · Excessive pain, swelling, redness or odor of or around the surgical area  · Temperature over 100.5  · Nausea and vomiting lasting longer than 4 hours or if unable to take medications  · Any signs of decreased circulation or nerve impairment to extremity: change in color, persistent  numbness, tingling, coldness or increase pain  · Any questions    What to do at Home:  Recommended activity: Activity as tolerated,     If you experience any of the following symptoms altered mental status, weakness, decrease in appetite, high blood pressure, edema, please follow up with PCP. *  Please give a list of your current medications to your Primary Care Provider. *  Please update this list whenever your medications are discontinued, doses are      changed, or new medications (including over-the-counter products) are added. *  Please carry medication information at all times in case of emergency situations. These are general instructions for a healthy lifestyle:    No smoking/ No tobacco products/ Avoid exposure to second hand smoke  Surgeon General's Warning:  Quitting smoking now greatly reduces serious risk to your health. Obesity, smoking, and sedentary lifestyle greatly increases your risk for illness    A healthy diet, regular physical exercise & weight monitoring are important for maintaining a healthy lifestyle    You may be retaining fluid if you have a history of heart failure or if you experience any of the following symptoms:  Weight gain of 3 pounds or more overnight or 5 pounds in a week, increased swelling in our hands or feet or shortness of breath while lying flat in bed. Please call your doctor as soon as you notice any of these symptoms; do not wait until your next office visit. Recognize signs and symptoms of STROKE:    F-face looks uneven    A-arms unable to move or move unevenly    S-speech slurred or non-existent    T-time-call 911 as soon as signs and symptoms begin-DO NOT go       Back to bed or wait to see if you get better-TIME IS BRAIN. Warning Signs of HEART ATTACK     Call 911 if you have these symptoms:   Chest discomfort.  Most heart attacks involve discomfort in the center of the chest that lasts more than a few minutes, or that goes away and comes back. It can feel like uncomfortable pressure, squeezing, fullness, or pain.  Discomfort in other areas of the upper body. Symptoms can include pain or discomfort in one or both arms, the back, neck, jaw, or stomach.  Shortness of breath with or without chest discomfort.  Other signs may include breaking out in a cold sweat, nausea, or lightheadedness. Don't wait more than five minutes to call 911 - MINUTES MATTER! Fast action can save your life. Calling 911 is almost always the fastest way to get lifesaving treatment. Emergency Medical Services staff can begin treatment when they arrive -- up to an hour sooner than if someone gets to the hospital by car. The discharge information has been reviewed with the caregiver. The caregiver verbalized understanding. Discharge medications reviewed with the caregiver and appropriate educational materials and side effects teaching were provided.   ___________________________________________________________________________________________________________________________________

## 2019-01-24 NOTE — PROGRESS NOTES
Transportation at Discharge: 1/24/19 Transport Company/Representative:  Phil Todd / [employer] Phone number: [phone] Method of Transport:  [facility] / Sherrie Esquivel Estimated pick-up time: 5:00P Destination: [facility] Insurance Info: Medicare / Medicaid Authorization: [id number] Per Sylvia Martinez with [employer] Requesting Outcomes Manager:  Olimpia Bullard, Care- ext [phone]

## 2019-01-24 NOTE — DISCHARGE SUMMARY
Discharge Summary    Patient: Noreen Shafer               Sex: female          DOA: 1/22/2019         YOB: 1940      Age:  70 y.o.        LOS:  LOS: 2 days                Admit Date: 1/22/2019    Discharge Date: 1/24/2019    Admission Diagnoses: Ischemic stroke [facility])    Discharge Diagnoses:    Problem List as of 1/24/2019 Date Reviewed: 1/24/2019          Codes Class Noted - Resolved    * (Principal) Acute encephalopathy ICD-10-CM: G93.40  ICD-9-CM: 348.30  1/24/2019 - Present        UTI (urinary tract infection) ICD-10-CM: N39.0  ICD-9-CM: 599.0  1/22/2019 - Present        ESRD (end stage renal disease) ([facility] ICD-10-CM: N18.6  ICD-9-CM: 585.6  1/22/2019 - Present        OA (osteoarthritis) ICD-10-CM: M19.90  ICD-9-CM: 715.90  1/22/2019 - Present        Diastolic CHF, chronic ([facility] ICD-10-CM: I50.32  ICD-9-CM: 428.32, 428.0  1/22/2019 - Present        PAD (peripheral artery disease) ([facility] ICD-10-CM: I73.9  ICD-9-CM: 443.9  1/22/2019 - Present        Nocturia ICD-10-CM: R35.1  ICD-9-CM: 788.43  2/25/2014 - Present        Incomplete bladder emptying ICD-10-CM: R33.9  ICD-9-CM: 788.21  2/25/2014 - Present        Recurrent UTI ICD-10-CM: N39.0  ICD-9-CM: 599.0  2/25/2014 - Present        Atrophic vaginitis ICD-10-CM: N95.2  ICD-9-CM: 627.3  2/25/2014 - Present        Microhematuria ICD-10-CM: R31.29  ICD-9-CM: 599.72  2/25/2014 - Present        Chronic kidney disease ICD-10-CM: N18.9  ICD-9-CM: 585.9  2/25/2014 - Present              Discharge Medications:     Current Discharge Medication List      START taking these medications    Details   levoFLOXacin (LEVAQUIN) 250 mg tablet Take 1 Tab by mouth every other day. Qty: 4 Tab, Refills: 0         CONTINUE these medications which have NOT CHANGED    Details   oxyCODONE-acetaminophen (PERCOCET) 5-325 mg per tablet Take 1 Tab by mouth every four (4) hours as needed for Pain. Max Daily Amount: 6 Tabs.   Qty: 20 Tab, Refills: 0      hydrALAZINE (APRESOLINE) 50 mg tablet Take 50 mg by mouth two (2) times a day. estradiol (ESTRACE) 0.01 % (0.1 mg/gram) vaginal cream Place 0.5 grams inside the vagina three nights weekly for thin vaginal tissues. Qty: 42.5 g, Refills: 5    Associated Diagnoses: Recurrent UTI; Atrophic vaginitis      omeprazole (PRILOSEC) 20 mg capsule Take 20 mg by mouth daily. ergocalciferol (VITAMIN D2) 50,000 unit capsule Take 50,000 Units by mouth.      guaiFENesin SR (MUCINEX) 600 mg SR tablet Take 600 mg by mouth two (2) times a day. losartan (COZAAR) 25 mg tablet Take  by mouth daily. metoprolol (LOPRESSOR) 50 mg tablet Take  by mouth two (2) times a day. NIFEdipine ER (PROCARDIA XL) 60 mg ER tablet Take 60 mg by mouth daily. prednisoLONE acetate (PRED FORTE) 1 % ophthalmic suspension Administer 1 Drop to both eyes four (4) times daily. senna-docusate (SENNA WITH DOCUSATE SODIUM) 8.6-50 mg per tablet Take 1 Tab by mouth daily. sodium bicarbonate 650 mg tablet Take  by mouth four (4) times daily. albuterol (PROVENTIL) 2 mg tablet Take 2 mg by mouth three (3) times daily. aspirin 81 mg tablet Take 81 mg by mouth. acetaminophen (TYLENOL) 325 mg tablet Take  by mouth every four (4) hours as needed. STOP taking these medications       ampicillin (PRINCIPEN) 500 mg capsule Comments:   Reason for Stopping:                Follow-up:PCP    Discharge Condition: Good    Activity: Activity as tolerated    Diet: Resume previous diet    Wound Care: As directed    Labs:  Labs: Results:       Chemistry Recent Labs     01/23/19  0430 01/22/19  1040   GLU 70* 85    142   K 4.1 4.9    100   CO2 34* 35*   BUN 19* 44*   CREA 4.61* 8.90*   CA 9.6 10.2*   AGAP 3 7   BUCR 4* 5*   AP  --  102   TP  --  6.6   ALB  --  2.7*   GLOB  --  3.9   AGRAT  --  0.7*      CBC w/Diff Recent Labs     01/23/19  0430 01/22/19  1040   WBC 8.8 11.8   RBC 3.17* 3.65*   HGB 10.1* 11.7*   HCT 28.7* 34.0*    477*   GRANS 50 61   LYMPH 32 26   EOS 1 1      Cardiac Enzymes Recent Labs     01/22/19  1040   CPK 19*   CKND1 CALCULATION NOT PERFORMED WHEN RESULT IS BELOW LINEAR LIMIT      Coagulation Recent Labs     01/22/19  1040   PTP 13.0   INR 1.0       Lipid Panel Lab Results   Component Value Date/Time    Cholesterol, total 150 01/22/2019 10:40 AM    HDL Cholesterol 71 (H) 01/22/2019 10:40 AM    LDL, calculated 63.6 01/22/2019 10:40 AM    VLDL, calculated 15.4 01/22/2019 10:40 AM    Triglyceride 77 01/22/2019 10:40 AM    CHOL/HDL Ratio 2.1 01/22/2019 10:40 AM      BNP No results for input(s): BNPP in the last 72 hours. Liver Enzymes Recent Labs     01/22/19  1040   TP 6.6   ALB 2.7*      SGOT 25      Thyroid Studies Lab Results   Component Value Date/Time    TSH 1.20 01/22/2019 10:40 AM          Imaging:  CT head on 1/22/2019:  1. No acute intracranial hemorrhage, mass effect, midline shift, or herniation. 2. Very mild nonspecific white matter disease likely representing chronic small  vessel changes. 3. Possible age-indeterminate superior left basal ganglia infarct, but  nonspecific and difficult to separate from adjacent deep hemispheric white  matter hypodensity. No definite CT evidence of acute cortical infarct is seen. Please note that noncontrast head CT may be normal in early acute infarct. Duplex Carotid feliciano:  · Technically difficult, limited examination with sub optimal imaging due to uncooperative and combative patient. · There is mild stenosis in the right ICA (<50%). · Unable to obtain any velocities from the left internal carotid artery. Vessel appears patent with mixed density plaque, degree of stenosis is undetermined.     Consults: Nephrology and Neurology    Treatment Team: Treatment Team: Attending Provider: Palomo Nicole MD; Consulting Provider: Raul Peres MD; Consulting Provider: Savannah Meza MD; Consulting Provider: Kim Mcknight MD; Utilization Review: Andrew Butirago Lyric Velazco RN; Consulting Provider: Sai Banks MD; Care Manager: Daniella Moscoso; Occupational Therapy Assistant: CINTHYA James; Physical Therapy Assistant: Ole Machado PTA    Significant Diagnostic Studies: labs: see recent results    EEG on 1/23/2019: This is an abormal EEG. The diffuse background slowing is consistent with encephalopathy. No seizures or epileptiform discharges were noted. ECHO:  Estimated left ventricular ejection fraction is 56 - 60%. Visually measured ejection fraction. Left ventricular mild concentric hypertrophy. Normal left ventricular wall motion, no regional wall motion abnormality noted. Moderate (grade 2) left ventricular diastolic dysfunction. History/Hospital Course:  Radha is a 70 y.o.  female with h/o CVA,diastolic chf,PAD,ESRD on HD,MDS,OA,asthma,diabetes,hypertension,presented to ED because of left facial droop and difficulty talking. Patient is awake,talking but her speech is difficult to [facility] the history mostly obtained from ED attending who first came to contact with patient. Per report,patient went to dialysis today, was noted to have left facial droop and difficulty talking. According to report,patient's speech is normally clear. In ED,CT head did not show any acute process,only noted was a possible age-indeterminate superior left basal ganglia infarct, but nonspecific. It was recommended to admit patient for possible ischemic stroke. Patient was also noted to have UTI. 1.Acute encephalopathy,metabolic on top of UTI   -Initially admitted for possible Ischemic stroke   -CT head c/w possible age-indeterminate superior left basal ganglia infarct, but  nonspecific and difficult to separate from adjacent deep hemispheric white  matter hypodensity.  No definite CT evidence of acute cortical infarct is seen  -Aspirin  -Neuro-checks  -Permissive hypertension initially,then meds resumed.  -Carotid duplex w/o significant stenosis  -ECHO c/w EF 56-60%  -Neurology consulted  -EEG c/w encephalopathy  -PT/OT/Speech  -No evidence of stroke,neurology has signed off on      2. UTI  -Ceftriaxone  -Blood cx ngtd  -Ucx ngdt  -Discharged on levaquin # 4 pills     3. ESRD   -On dialysis now. Nephrology consulted     4. Diastolic HF  5. PAD  6. Diabetes  -Resumed other meds for her chronic medical problems.     7. HTN   -Initially permissive hypertension. Then resumed meds    Patient evaluated today and noted to be clinically stable for discharge. Time for discharge:40 minutes.     Jos Sequeira MD  January 24, 2019

## 2019-01-24 NOTE — PROGRESS NOTES
Bedside and Verbal shift change report given to [facility] (oncoming nurse) by Lico Ta RN (offgoing nurse). Report included the following information SBAR, Kardex, Intake/Output and Cardiac Rhythm NSR. Pt alert and oriented x3, vitals WNL, no acute concerns, some mild efforts to leave her chair, sitter to be ordered. No NIH changes

## 2019-01-24 NOTE — PROGRESS NOTES
Nephrology  Daily Progress Note Impression: 
ESRD- HD TTS 
            -Iwona Corrales             -0'07\"             -FMI 52.5 
            -Access: Right leg graft Admitted w/ ? CVA and encephalopathy UTI Hx of diverticulosis h/o MDS Anemia of ESRD Hearing impairment Mineral bone disorder PAD Sickle cell trait Chronic HepC HTN 
HLD Seizure disorder 
  
Plan: 
-HD today - saw her during HD and she was tolerating the procedure well. Discussed with dialysis staff  
-Epoetin; Recommend prn transfusion to maintain hgb >7 
-renal diet Patient Active Problem List  
Diagnosis Code  Nocturia R35.1  Incomplete bladder emptying R33.9  Recurrent UTI N39.0  Atrophic vaginitis N95.2  Microhematuria R31.29  
 Chronic kidney disease N18.9  
 Ischemic stroke (HCC) I63.9  
 UTI (urinary tract infection) N39.0  ESRD (end stage renal disease) ([facility] N18.6  OA (osteoarthritis) Z89.47  
 Diastolic CHF, chronic (HCC) I50.32  
 PAD (peripheral artery disease) (HCC) I73.9 Subjective: 
Saw her during HD - tolerating it well and VS were stable. Sleeping and ROS was not possible; has been like this all day apparently per report that HD nurse received from pt's nurse. Physical Exam: 
Patient Vitals for the past 24 hrs: 
 Temp Pulse Resp BP SpO2  
01/24/19 1130  74 18 101/66   
01/24/19 1115  71 18 158/71   
01/24/19 1100  71 18 150/72   
01/24/19 1045  68 18 138/67   
01/24/19 1030  72 18 162/74   
01/24/19 1025  70 18 161/73   
01/24/19 1005 98.6 °F (37 °C) 69 18 146/66   
01/24/19 0936 97.8 °F (36.6 °C) 78 18 173/56 93 % 01/24/19 0630 97.7 °F (36.5 °C) 77 18 163/87 91 % 01/24/19 0220 97.8 °F (36.6 °C) 80 16 139/72 95 % 01/23/19 2225 98.1 °F (36.7 °C) 84 16 147/56 94 % 01/23/19 2040     95 % 01/23/19 2025 99 °F (37.2 °C) 70 16 160/72 95 % 01/23/19 1745 99 °F (37.2 °C) 73 16 169/66 93 % 01/23/19 1421 97.9 °F (36.6 °C) 70 16 178/78 96 % Wt Readings from Last 4 Encounters:  
01/24/19 54.4 kg (120 lb)  
09/26/16 53.5 kg (118 lb)  
02/25/14 67.6 kg (149 lb)  
01/20/14 67.6 kg (149 lb) Intake/Output Summary (Last 24 hours) at 1/24/2019 1148 Last data filed at 1/24/2019 8159 Gross per 24 hour Intake 120 ml Output  Net 120 ml Sleeping comfortably in NAD 
CV: RRR Lungs: Clear Abd: Soft, Nontender Extrem: No edema Labs CBC w/Diff Recent Labs  
  01/23/19 
0430 01/22/19 
1040 WBC 8.8 11.8  
RBC 3.17* 3.65* HGB 10.1* 11.7* HCT 28.7* 34.0*  
 477* GRANS 50 61 LYMPH 32 26 EOS 1 1 Chemistry Recent Labs  
  01/23/19 
0430 01/22/19 
1040 GLU 70* 85  142  
K 4.1 4.9  100 CO2 34* 35* BUN 19* 44* CREA 4.61* 8.90* CA 9.6 10.2* AGAP 3 7 BUCR 4* 5* AP  --  102 TP  --  6.6 ALB  --  2.7*  
GLOB  --  3.9 AGRAT  --  0.7* No results found for: IRON, FE, TIBC, IBCT, PSAT, FERR Lab Results Component Value Date/Time Calcium 9.6 01/23/2019 04:30 AM  
  
 
 
Meds reviewed in [facility] Current Facility-Administered Medications Medication Dose Route Frequency  glucose chewable tablet 16 g  4 Tab Oral PRN  
 glucagon (GLUCAGEN) injection 1 mg  1 mg IntraMUSCular PRN  
 dextrose (D50) infusion 12.5 g  25 mL IntraVENous PRN  
 levETIRAcetam (KEPPRA) tablet 500 mg  500 mg Oral DIALYSIS PRN  
 levETIRAcetam (KEPPRA) tablet 500 mg  500 mg Oral DAILY  hydrALAZINE (APRESOLINE) tablet 50 mg  50 mg Oral BID  losartan (COZAAR) tablet 25 mg  25 mg Oral DAILY  metoprolol tartrate (LOPRESSOR) tablet 50 mg  50 mg Oral BID  
 NIFEdipine ER (PROCARDIA XL) tablet 60 mg  60 mg Oral DAILY  pantoprazole (PROTONIX) tablet 40 mg  40 mg Oral ACB  oxyCODONE-acetaminophen (PERCOCET) 5-325 mg per tablet 1 Tab  1 Tab Oral Q6H PRN  prednisoLONE acetate (PRED FORTE) 1 % ophthalmic suspension 1 Drop  1 Drop Both Eyes QID  0.9% sodium chloride infusion  100 mL/hr IntraVENous DIALYSIS PRN  
 ondansetron (ZOFRAN) injection 2 mg  2 mg IntraVENous Q6H PRN  
 aspirin tablet 325 mg  325 mg Oral DAILY  acetaminophen (TYLENOL) tablet 650 mg  650 mg Oral Q4H PRN  
 senna-docusate (PERICOLACE) 8.6-50 mg per tablet 2 Tab  2 Tab Oral QHS  albuterol (PROVENTIL VENTOLIN) nebulizer solution 2.5 mg  2.5 mg Nebulization Q2B PRN  
 folic acid (FOLVITE) 1 mg, thiamine (B-1) 100 mg in 0.9% sodium chloride 50 mL ivpb   IntraVENous ACD  cefTRIAXone (ROCEPHIN) 2 g in 0.9% sodium chloride (MBP/ADV) 50 mL MBP  2 g IntraVENous Q24H  
 epoetin emerson (EPOGEN;PROCRIT) injection 2,720 Units  50 Units/kg IntraVENous DIALYSIS NAVARRO KAY & SAT Thank Betzaida Soto MD 
[facility]

## 2019-01-24 NOTE — PROGRESS NOTES
Neurology Progress Note Admit Date: 1/22/2019 Length of Stay: 2 Primary Care: Abraham Gaines MD  
 
 
Assessment/Plan Encephalopathy UTI on top of poor cognitive reserve and probably dementia. By history from Ms. Hodgson she is either at baseline or near baseline anyway. 
-No further neuorlogical work up. 
-Will need to go back to he rehab facility. I understand they were looking into getting her evaluated for dementia because they were concerned she could not live alone. I agree with that plan. History of Seizures Continue keppra 500mg daily and 500mg additional dose after dialysis. Hx of Stroke And has right hand contracture and right arm weakness Continue aspirin 81mg. Continue microvascular control. . LDL already < 70 and not on statin so she does not need that. UTI Treat per medical team. 
 
Neurology will sign off. Please call back if needed. Interim History: No significant changes. She just came back from Dialysis. Discussed with: Nurse Willy Mike Allergies: Allergies Allergen Reactions  Sulfa (Sulfonamide Antibiotics) Itching Vital Signs:  
Visit Vitals /80 (BP 1 Location: Right arm, BP Patient Position: At rest) Pulse 73 Temp 97.7 °F (36.5 °C) Resp 16 Ht 5' 5\" (1.651 m) Wt 54.4 kg (120 lb) SpO2 94% BMI 19.97 kg/m² Neurological examination:  
· Appearance: In no acute distress, Cachexic. · Cardiovascular: Heart is regular rate and rhythm, peripheral edema is absent. No carotid bruits heard · Mental status examination: Alert and oriented X 1. [place]. Speech slurred and difficult to understand. No dentures. Intermittently follows some simple commands when written on a whiteboard. Very difficult for her to hear - has cochlear implant · Cranial Nerves:  
  
I: smell Not tested II: visual fields Not tested II: pupils Equal, round, impaired reactive to light at 3mm III,VII: ptosis none III,IV,VI: extraocular muscles  Full ROM V: facial light touch sensation  normal  
V,VII: corneal reflex     
VII: facial muscle function  normal  
VIII: hearing    
IX: soft palate elevation IX,X: gag reflex    
XI: sternocleidomastoid strength    
XII: tongue strength  .    
  
. · Motor exam: Station, gait:  deferred. Muscle tone, bulk and manual muscle testing: antigravity LUE, antigravity with drift RUE with contracture to hand, Moves bilateral lower extremities. Spacticity RUE. ·  
 
 
 
 
Review of Systems: 
   
   
   
   
   
   
   
   
   
   
   
   
   
PMH:  
Past Medical History:  
Diagnosis Date  Acquired hypothyroidism   
 patient denies  Asthma  Bilateral cataracts  Chronic kidney disease  Diabetes mellitus  Essential hypertension  H/O drug abuse Heroin  Kidney stone FH: History reviewed. No pertinent family history. SH: Social History Socioeconomic History  Marital status: SINGLE Spouse name: Not on file  Number of children: Not on file  Years of education: Not on file  Highest education level: Not on file Tobacco Use  Smoking status: Former Smoker Last attempt to quit: 1986 Years since quittin.0  Smokeless tobacco: Former User Substance and Sexual Activity  Alcohol use: No  
 Drug use: No  
    
 
Medications:   
[] REVIEWED Current Facility-Administered Medications Medication Dose Route Frequency  glucose chewable tablet 16 g  4 Tab Oral PRN  
 glucagon (GLUCAGEN) injection 1 mg  1 mg IntraMUSCular PRN  
 dextrose (D50) infusion 12.5 g  25 mL IntraVENous PRN  
 levETIRAcetam (KEPPRA) tablet 500 mg  500 mg Oral DIALYSIS PRN  
 levETIRAcetam (KEPPRA) tablet 500 mg  500 mg Oral DAILY  hydrALAZINE (APRESOLINE) tablet 50 mg  50 mg Oral BID  losartan (COZAAR) tablet 25 mg  25 mg Oral DAILY  metoprolol tartrate (LOPRESSOR) tablet 50 mg  50 mg Oral BID  
  NIFEdipine ER (PROCARDIA XL) tablet 60 mg  60 mg Oral DAILY  pantoprazole (PROTONIX) tablet 40 mg  40 mg Oral ACB  oxyCODONE-acetaminophen (PERCOCET) 5-325 mg per tablet 1 Tab  1 Tab Oral Q6H PRN  prednisoLONE acetate (PRED FORTE) 1 % ophthalmic suspension 1 Drop  1 Drop Both Eyes QID  
 0.9% sodium chloride infusion  100 mL/hr IntraVENous DIALYSIS PRN  
 ondansetron (ZOFRAN) injection 2 mg  2 mg IntraVENous Q6H PRN  
 aspirin tablet 325 mg  325 mg Oral DAILY  acetaminophen (TYLENOL) tablet 650 mg  650 mg Oral Q4H PRN  
 senna-docusate (PERICOLACE) 8.6-50 mg per tablet 2 Tab  2 Tab Oral QHS  albuterol (PROVENTIL VENTOLIN) nebulizer solution 2.5 mg  2.5 mg Nebulization O4A PRN  
 folic acid (FOLVITE) 1 mg, thiamine (B-1) 100 mg in 0.9% sodium chloride 50 mL ivpb   IntraVENous ACD  cefTRIAXone (ROCEPHIN) 2 g in 0.9% sodium chloride (MBP/ADV) 50 mL MBP  2 g IntraVENous Q24H  
 epoetin emerson (EPOGEN;PROCRIT) injection 2,720 Units  50 Units/kg IntraVENous DIALYSIS TUE, THU & SAT Data:   
 
[] REVIEWED Recent Results (from the past 24 hour(s)) GLUCOSE, POC Collection Time: 01/23/19  2:31 PM  
Result Value Ref Range Glucose (POC) 73 70 - 110 mg/dL DUPLEX CAROTID BILATERAL Collection Time: 01/23/19  4:38 PM  
Result Value Ref Range Right cca dist sys 54.80 cm/s Right CCA dist [place] 13.10 cm/s RIGHT COMMON CAROTID ARTERY MID S 65.10 cm/s RIGHT COMMON CAROTID ARTERY MID D 11.60 cm/s Right CCA prox sys 93.80 cm/s Right CCA prox [place] 6.40 cm/s Right vertebral sys 46.00 cm/s RIGHT VERTEBRAL ARTERY D 8.70 cm/s Right eca sys 254.50 cm/s RIGHT EXTERNAL CAROTID ARTERY D 0.00 cm/s Right ICA dist sys 51.50 cm/s Right ICA dist [place] 9.80 cm/s Right ICA mid sys 92.20 cm/s Right ICA mid [place] 13.10 cm/s Right ICA prox sys 74.60 cm/s Right ICA prox [place] 14.20 cm/s Right subclavian sys 79.70 cm/s RIGHT SUBCLAVIAN ARTERY D 0.00 cm/s Right ICA/CCA sys 1.00 Left CCA dist sys 55.60 cm/s Left CCA dist [place] 6.10 cm/s LEFT COMMON CAROTID ARTERY MID S 78.60 cm/s LEFT COMMON CAROTID ARTERY MID D 0.00 cm/s Left CCA prox sys 79.70 cm/s Left CCA prox [place] 0.00 cm/s Left vertebral sys 50.10 cm/s LEFT VERTEBRAL ARTERY D 7.20 cm/s Left ECA sys 75.30 cm/s LEFT EXTERNAL CAROTID ARTERY D 0.00 cm/s GLUCOSE, POC Collection Time: 01/23/19  5:51 PM  
Result Value Ref Range Glucose (POC) 116 (H) 70 - 110 mg/dL GLUCOSE, POC Collection Time: 01/23/19 10:21 PM  
Result Value Ref Range Glucose (POC) 85 70 - 110 mg/dL GLUCOSE, POC Collection Time: 01/24/19  7:39 AM  
Result Value Ref Range Glucose (POC) 83 70 - 110 mg/dL ECHO ADULT COMPLETE Collection Time: 01/24/19  9:36 AM  
Result Value Ref Range Aortic Valve Systolic Peak Velocity 8.17 cm/s AoV PG 0.0 mmHg LVIDd 5.12 3.9 - 5.3 cm  
 LVPWd 1.17 (A) 0.6 - 0.9 cm LVIDs 3.71 cm IVSd 1.04 (A) 0.6 - 0.9 cm  
 LV ED Vol A2C 95.3 mL  
 LV ES Vol A4C 39.6 mL  
 LV ES Vol BP 41.2 19 - 49 mL  
 LVOT d 1.86 cm  
 LVOT Peak Velocity 91.72 cm/s LVOT Peak Gradient 3.4 mmHg MVA (PHT) 5.0 cm2  
 MV A Johnson 122.02 cm/s  
 MV E Johnson 0.79 cm/s  
 MV E/A 0.01   
 BP EF 55.9 55 - 100 % LV Ejection Fraction MOD 4C 56 % LV Ejection Fraction MOD 2C 58 % Inferior [place] Diameter Sniffing 2.05 cm LA Vol 4C 74.10 (A) 22 - 52 mL  
 LA Vol 2C 86.19 (A) 22 - 52 mL  
 LV Mass .6 (A) 67 - 162 g  
 LV Mass AL Index 161.8 (A) 43 - 95 g/m2 RVSP 17.5 mmHg LV ES Vol A2C 39.9 mL  
 LVES Vol Index BP 25.9 mL/m2 LV ED Vol A4C 90.8 mL  
 LVED Vol Index BP 58.6 mL/m2 Est. RA Pressure 10.0 mmHg Mitral Valve E Wave Deceleration Time 153.2 ms Mitral Valve Pressure Half-time 44.4 ms Triscuspid Valve Regurgitation Peak Gradient 7.5 mmHg LV ED Vol BP 93.3 56 - 104 ml  
 TR Max Velocity -137.26 cm/s PASP 43.0 mmHg LA Vol Index 54.14 16 - 28 ml/m2 LA Vol Index 46.54 16 - 28 ml/m2 LVED Vol Index A4C 57.0 mL/m2 LVED Vol Index A2C 59.9 mL/m2 LVES Vol Index A4C 24.9 mL/m2 LVES Vol Index A2C 25.1 mL/m2 Ao Root D 2.60 cm GLUCOSE, POC Collection Time: 01/24/19  2:24 PM  
Result Value Ref Range Glucose (POC) 84 70 - 110 mg/dL Osmani Herndon MD 
2:27 PM 
01/24/19

## 2019-01-24 NOTE — PROGRESS NOTES
Hospitalist Progress Note Brigitte Garcias MD 
Internal medicine/ Hospitalist 
 
Daily Progress Note: 1/24/2019    
Interval history / Subjective:  
Dimple Jameson is a 70 y.o.  female with h/o CVA,diastolic chf,PAD,ESRD on HD,MDS,OA,asthma,diabetes,hypertension,presented to ED because of left facial droop and difficulty talking. Patient is awake,talking but her speech is difficult to [facility] the history mostly obtained from ED attending who first came to contact with patient. Per report,patient went to dialysis today, was noted to have left facial droop and difficulty talking. According to report,patient's speech is normally clear. In ED,CT head did not show any acute process,only noted was a possible age-indeterminate superior left basal ganglia infarct, but nonspecific. It was recommended to admit patient for possible ischemic stroke. Patient was also noted to have UTI. 
  
 
Current Facility-Administered Medications Medication Dose Route Frequency  glucose chewable tablet 16 g  4 Tab Oral PRN  
 glucagon (GLUCAGEN) injection 1 mg  1 mg IntraMUSCular PRN  
 dextrose (D50) infusion 12.5 g  25 mL IntraVENous PRN  
 levETIRAcetam (KEPPRA) tablet 500 mg  500 mg Oral DIALYSIS PRN  
 levETIRAcetam (KEPPRA) tablet 500 mg  500 mg Oral DAILY  hydrALAZINE (APRESOLINE) tablet 50 mg  50 mg Oral BID  losartan (COZAAR) tablet 25 mg  25 mg Oral DAILY  metoprolol tartrate (LOPRESSOR) tablet 50 mg  50 mg Oral BID  
 NIFEdipine ER (PROCARDIA XL) tablet 60 mg  60 mg Oral DAILY  pantoprazole (PROTONIX) tablet 40 mg  40 mg Oral ACB  oxyCODONE-acetaminophen (PERCOCET) 5-325 mg per tablet 1 Tab  1 Tab Oral Q6H PRN  prednisoLONE acetate (PRED FORTE) 1 % ophthalmic suspension 1 Drop  1 Drop Both Eyes QID  
 0.9% sodium chloride infusion  100 mL/hr IntraVENous DIALYSIS PRN  
 ondansetron (ZOFRAN) injection 2 mg  2 mg IntraVENous Q6H PRN  
 aspirin tablet 325 mg  325 mg Oral DAILY  acetaminophen (TYLENOL) tablet 650 mg  650 mg Oral Q4H PRN  
 senna-docusate (PERICOLACE) 8.6-50 mg per tablet 2 Tab  2 Tab Oral QHS  albuterol (PROVENTIL VENTOLIN) nebulizer solution 2.5 mg  2.5 mg Nebulization I4L PRN  
 folic acid (FOLVITE) 1 mg, thiamine (B-1) 100 mg in 0.9% sodium chloride 50 mL ivpb   IntraVENous ACD  cefTRIAXone (ROCEPHIN) 2 g in 0.9% sodium chloride (MBP/ADV) 50 mL MBP  2 g IntraVENous Q24H  
 epoetin emerson (EPOGEN;PROCRIT) injection 2,720 Units  50 Units/kg IntraVENous DIALYSIS TUE, THU & SAT Review of Systems No complaint. Objective:  
 
Visit Vitals /56 (BP 1 Location: Right arm, BP Patient Position: At rest) Pulse 78 Temp 97.8 °F (36.6 °C) Resp 18 Ht 5' 5\" (1.651 m) Wt 54.4 kg (120 lb) SpO2 93% BMI 19.97 kg/m² O2 Device: Room air Temp (24hrs), Av.2 °F (36.8 °C), Min:97.7 °F (36.5 °C), Max:99 °F (37.2 °C) No intake/output data recorded.  1901 -  0700 In: 120 [P.O.:120] Out: - PHYSICAL EXAM:  
General:          Lying in bed,not in distress. HEENT:           Pupils equal.  Sclera anicteric. Mouth dry. Neck:               Supple. No JVD 
CV:                  Regular rate and rhythm. Lungs:             CTAb Abdomen:        Soft, non-tender. BS present Extremities:     No cyanosis. No edema. No clubbing Neurologic:      Alert and oriented X 3. No facial droop. Contracture right hand - chronic. Skin:                Warm and dry. No rashes. Data Review Recent Results (from the past 12 hour(s)) GLUCOSE, POC Collection Time: 19 10:21 PM  
Result Value Ref Range Glucose (POC) 85 70 - 110 mg/dL GLUCOSE, POC Collection Time: 19  7:39 AM  
Result Value Ref Range Glucose (POC) 83 70 - 110 mg/dL Assessment/Plan:  
 
Principal Problem: 
  Ischemic stroke ([address] (2019) Active Problems: 
  UTI (urinary tract infection) (2019) ESRD (end stage renal disease) ([facility] (1/22/2019) OA (osteoarthritis) (1/22/2019) Diastolic CHF, chronic ([facility] (1/22/2019) PAD (peripheral artery disease) ([facility] (1/22/2019) Care Plan 1. Acute encephalopathy 
 -Initially admitted for possible Ischemic stroke 
 -CT head c/w possible age-indeterminate superior left basal ganglia infarct, but 
nonspecific and difficult to separate from adjacent deep hemispheric white 
matter hypodensity. No definite CT evidence of acute cortical infarct is seen 
-Aspirin 
-Neuro-checks 
-Permissive hypertension initially,then meds resumed. 
-Carotid duplex w/o significant stenosis -ECHO pending results 
-Neurology consulted -EEG c/w encephalopathy 
-PT/OT/Speech 
  
2. UTI 
-Ceftriaxone 
-Blood cx ngtd 
-Ucx ngdt 
  
3. ESRD 
 -Undergoing dialysis now. Nephrology consult 
  
4. Diastolic HF 
5. PAD 6. Diabetes 
-Resumed other meds for her chronic medical problems. 7. HTN  
-Initially permissive hypertension. Then resumed meds 
  
DVT prophylaxis:scds Full code Disposition:tbd

## 2019-01-28 LAB
BACTERIA SPEC CULT: NORMAL
BACTERIA SPEC CULT: NORMAL
SERVICE CMNT-IMP: NORMAL
SERVICE CMNT-IMP: NORMAL